# Patient Record
Sex: FEMALE | Race: BLACK OR AFRICAN AMERICAN | NOT HISPANIC OR LATINO | Employment: FULL TIME | URBAN - METROPOLITAN AREA
[De-identification: names, ages, dates, MRNs, and addresses within clinical notes are randomized per-mention and may not be internally consistent; named-entity substitution may affect disease eponyms.]

---

## 2023-03-21 LAB — ALPHA 1 ANTITRYPSIN (MG/DL) IN SER/PLAS: 146 MG/DL (ref 84–218)

## 2023-04-28 ENCOUNTER — OFFICE VISIT (OUTPATIENT)
Dept: PRIMARY CARE | Facility: CLINIC | Age: 43
End: 2023-04-28
Payer: COMMERCIAL

## 2023-04-28 VITALS — WEIGHT: 138 LBS | DIASTOLIC BLOOD PRESSURE: 68 MMHG | BODY MASS INDEX: 27.87 KG/M2 | SYSTOLIC BLOOD PRESSURE: 119 MMHG

## 2023-04-28 DIAGNOSIS — T78.40XS ALLERGY, SEQUELA: Primary | ICD-10-CM

## 2023-04-28 DIAGNOSIS — S63.622A SPRAIN OF INTERPHALANGEAL JOINT OF LEFT THUMB, INITIAL ENCOUNTER: ICD-10-CM

## 2023-04-28 LAB
ANION GAP IN SER/PLAS: 12 MMOL/L (ref 10–20)
BASOPHILS (10*3/UL) IN BLOOD BY AUTOMATED COUNT: 0.05 X10E9/L (ref 0–0.1)
BASOPHILS/100 LEUKOCYTES IN BLOOD BY AUTOMATED COUNT: 0.7 % (ref 0–2)
CALCIUM (MG/DL) IN SER/PLAS: 8.6 MG/DL (ref 8.6–10.3)
CARBON DIOXIDE, TOTAL (MMOL/L) IN SER/PLAS: 28 MMOL/L (ref 21–32)
CHLORIDE (MMOL/L) IN SER/PLAS: 101 MMOL/L (ref 98–107)
CREATININE (MG/DL) IN SER/PLAS: 0.87 MG/DL (ref 0.5–1.05)
EOSINOPHILS (10*3/UL) IN BLOOD BY AUTOMATED COUNT: 0.06 X10E9/L (ref 0–0.7)
EOSINOPHILS/100 LEUKOCYTES IN BLOOD BY AUTOMATED COUNT: 0.9 % (ref 0–6)
ERYTHROCYTE DISTRIBUTION WIDTH (RATIO) BY AUTOMATED COUNT: 15 % (ref 11.5–14.5)
ERYTHROCYTE MEAN CORPUSCULAR HEMOGLOBIN CONCENTRATION (G/DL) BY AUTOMATED: 31.3 G/DL (ref 32–36)
ERYTHROCYTE MEAN CORPUSCULAR VOLUME (FL) BY AUTOMATED COUNT: 81 FL (ref 80–100)
ERYTHROCYTES (10*6/UL) IN BLOOD BY AUTOMATED COUNT: 5.11 X10E12/L (ref 4–5.2)
GFR FEMALE: 84 ML/MIN/1.73M2
GLUCOSE (MG/DL) IN SER/PLAS: 79 MG/DL (ref 74–99)
HEMATOCRIT (%) IN BLOOD BY AUTOMATED COUNT: 41.6 % (ref 36–46)
HEMOGLOBIN (G/DL) IN BLOOD: 13 G/DL (ref 12–16)
IMMATURE GRANULOCYTES/100 LEUKOCYTES IN BLOOD BY AUTOMATED COUNT: 0.1 % (ref 0–0.9)
LEUKOCYTES (10*3/UL) IN BLOOD BY AUTOMATED COUNT: 6.7 X10E9/L (ref 4.4–11.3)
LYMPHOCYTES (10*3/UL) IN BLOOD BY AUTOMATED COUNT: 2.07 X10E9/L (ref 1.2–4.8)
LYMPHOCYTES/100 LEUKOCYTES IN BLOOD BY AUTOMATED COUNT: 30.9 % (ref 13–44)
MONOCYTES (10*3/UL) IN BLOOD BY AUTOMATED COUNT: 0.48 X10E9/L (ref 0.1–1)
MONOCYTES/100 LEUKOCYTES IN BLOOD BY AUTOMATED COUNT: 7.2 % (ref 2–10)
NEUTROPHILS (10*3/UL) IN BLOOD BY AUTOMATED COUNT: 4.03 X10E9/L (ref 1.2–7.7)
NEUTROPHILS/100 LEUKOCYTES IN BLOOD BY AUTOMATED COUNT: 60.2 % (ref 40–80)
PLATELETS (10*3/UL) IN BLOOD AUTOMATED COUNT: 264 X10E9/L (ref 150–450)
POTASSIUM (MMOL/L) IN SER/PLAS: 3.7 MMOL/L (ref 3.5–5.3)
SODIUM (MMOL/L) IN SER/PLAS: 137 MMOL/L (ref 136–145)
UREA NITROGEN (MG/DL) IN SER/PLAS: 12 MG/DL (ref 6–23)

## 2023-04-28 PROCEDURE — 1036F TOBACCO NON-USER: CPT | Performed by: INTERNAL MEDICINE

## 2023-04-28 PROCEDURE — 99213 OFFICE O/P EST LOW 20 MIN: CPT | Performed by: INTERNAL MEDICINE

## 2023-04-28 RX ORDER — EPINEPHRINE 0.15 MG/.3ML
1.5 INJECTION INTRAMUSCULAR ONCE
Qty: 2 EACH | Refills: 1 | Status: SHIPPED | OUTPATIENT
Start: 2023-04-28 | End: 2023-04-28

## 2023-04-28 ASSESSMENT — PAIN SCALES - GENERAL: PAINLEVEL: 4

## 2023-04-28 NOTE — PROGRESS NOTES
Subjective   Patient ID: Nisa Kenny is a 42 y.o. female who presents for Hand Pain.    HPI   42 years old female comes in to see me because of pain on her left thumb for a month now.  She did hurt her thumb on the left while moving furniture a month ago.  It is swollen and it hurts.  She is also having surgery laparoscopic for endometriosis.  She had an MRI of the pelvis which showed leiomyomas.  Surgery is scheduled for May 12 this year.  Lives with her mother in [place].  Allergic to seasonal allergy nuts and shellfish.  Non-smoker and no alcohol intake.  Works as a .  Because of her allergies epipen recommended and ordered  Review of Systems  12 system reviewed and all negative except for bad sinus allergies pain on the left thumb.  She and cardiology before her procedure.  Using ice on her left thumb so far for a month very minimal improvement.  Medications are albuterol inhaler, Advair discus twice a day, montelukast,  Objective   /68 (BP Location: Right arm, Patient Position: Sitting)   Wt 62.6 kg (138 lb)   BMI 27.87 kg/m²     Physical Exam  Alert oriented pleasant cooperative in no distress.  Face symmetrical cranial nerves intact.  Lungs clear no rales no wheezing or crackles.  Neck supple no masses no lymph no thyromegaly or jugular venous distention.  Heart normal S1 and S2 regular rhythm.  Abdomen benign neurologically intact  Assessment/Plan   Diagnoses and all orders for this visit:  Allergy, sequela  -     EPINEPHrine (Epipen-JR) 0.15 mg/0.3 mL injection syringe; Inject 3 mL (1.5 mg) as directed 1 time for 1 dose. Call 911 after use.  Sprain of interphalangeal joint of left thumb, initial encounter

## 2023-04-30 ENCOUNTER — TELEPHONE (OUTPATIENT)
Dept: PRIMARY CARE | Facility: CLINIC | Age: 43
End: 2023-04-30
Payer: COMMERCIAL

## 2023-05-12 ENCOUNTER — TELEPHONE (OUTPATIENT)
Dept: PRIMARY CARE | Facility: CLINIC | Age: 43
End: 2023-05-12
Payer: COMMERCIAL

## 2023-05-12 ENCOUNTER — HOSPITAL ENCOUNTER (OUTPATIENT)
Dept: DATA CONVERSION | Facility: HOSPITAL | Age: 43
Discharge: HOME | End: 2023-05-13
Attending: OBSTETRICS & GYNECOLOGY | Admitting: OBSTETRICS & GYNECOLOGY
Payer: COMMERCIAL

## 2023-05-12 DIAGNOSIS — Z91.040 LATEX ALLERGY STATUS: ICD-10-CM

## 2023-05-12 DIAGNOSIS — D25.9 LEIOMYOMA OF UTERUS, UNSPECIFIED: ICD-10-CM

## 2023-05-12 DIAGNOSIS — N80.9 ENDOMETRIOSIS, UNSPECIFIED: ICD-10-CM

## 2023-05-12 DIAGNOSIS — J45.909 UNSPECIFIED ASTHMA, UNCOMPLICATED (HHS-HCC): ICD-10-CM

## 2023-05-12 DIAGNOSIS — Z86.73 PERSONAL HISTORY OF TRANSIENT ISCHEMIC ATTACK (TIA), AND CEREBRAL INFARCTION WITHOUT RESIDUAL DEFICITS: ICD-10-CM

## 2023-05-12 LAB
ABO GROUP (TYPE) IN BLOOD: NORMAL
ANTIBODY SCREEN: NORMAL
ERYTHROCYTE DISTRIBUTION WIDTH (RATIO) BY AUTOMATED COUNT: 15 % (ref 11.5–14.5)
ERYTHROCYTE MEAN CORPUSCULAR HEMOGLOBIN CONCENTRATION (G/DL) BY AUTOMATED: 30.8 G/DL (ref 32–36)
ERYTHROCYTE MEAN CORPUSCULAR VOLUME (FL) BY AUTOMATED COUNT: 84 FL (ref 80–100)
ERYTHROCYTES (10*6/UL) IN BLOOD BY AUTOMATED COUNT: 4.78 X10E12/L (ref 4–5.2)
HEMATOCRIT (%) IN BLOOD BY AUTOMATED COUNT: 40 % (ref 36–46)
HEMOGLOBIN (G/DL) IN BLOOD: 12.3 G/DL (ref 12–16)
LEUKOCYTES (10*3/UL) IN BLOOD BY AUTOMATED COUNT: 13 X10E9/L (ref 4.4–11.3)
PLATELETS (10*3/UL) IN BLOOD AUTOMATED COUNT: 239 X10E9/L (ref 150–450)
RH FACTOR: NORMAL

## 2023-05-12 NOTE — TELEPHONE ENCOUNTER
Called the patient today and left her a message on her cell phone over her phone indicating the she was typed and matched for blood and may be needing a blood transfusion?.  Left her a message stating to call me back with any update and wishing her well.

## 2023-05-13 LAB
ANION GAP IN SER/PLAS: 15 MMOL/L (ref 10–20)
BASOPHILS (10*3/UL) IN BLOOD BY AUTOMATED COUNT: 0.01 X10E9/L (ref 0–0.1)
BASOPHILS/100 LEUKOCYTES IN BLOOD BY AUTOMATED COUNT: 0.1 % (ref 0–2)
CALCIUM (MG/DL) IN SER/PLAS: 8.4 MG/DL (ref 8.6–10.3)
CARBON DIOXIDE, TOTAL (MMOL/L) IN SER/PLAS: 25 MMOL/L (ref 21–32)
CHLORIDE (MMOL/L) IN SER/PLAS: 104 MMOL/L (ref 98–107)
CREATININE (MG/DL) IN SER/PLAS: 0.79 MG/DL (ref 0.5–1.05)
ERYTHROCYTE DISTRIBUTION WIDTH (RATIO) BY AUTOMATED COUNT: 15.3 % (ref 11.5–14.5)
ERYTHROCYTE MEAN CORPUSCULAR HEMOGLOBIN CONCENTRATION (G/DL) BY AUTOMATED: 31.3 G/DL (ref 32–36)
ERYTHROCYTE MEAN CORPUSCULAR VOLUME (FL) BY AUTOMATED COUNT: 83 FL (ref 80–100)
ERYTHROCYTES (10*6/UL) IN BLOOD BY AUTOMATED COUNT: 4.22 X10E12/L (ref 4–5.2)
GFR FEMALE: >90 ML/MIN/1.73M2
GLUCOSE (MG/DL) IN SER/PLAS: 108 MG/DL (ref 74–99)
HEMATOCRIT (%) IN BLOOD BY AUTOMATED COUNT: 35.1 % (ref 36–46)
HEMOGLOBIN (G/DL) IN BLOOD: 11 G/DL (ref 12–16)
IMMATURE GRANULOCYTES/100 LEUKOCYTES IN BLOOD BY AUTOMATED COUNT: 0.4 % (ref 0–0.9)
LEUKOCYTES (10*3/UL) IN BLOOD BY AUTOMATED COUNT: 10.5 X10E9/L (ref 4.4–11.3)
LYMPHOCYTES (10*3/UL) IN BLOOD BY AUTOMATED COUNT: 0.85 X10E9/L (ref 1.2–4.8)
LYMPHOCYTES/100 LEUKOCYTES IN BLOOD BY AUTOMATED COUNT: 8.1 % (ref 13–44)
MONOCYTES (10*3/UL) IN BLOOD BY AUTOMATED COUNT: 0.88 X10E9/L (ref 0.1–1)
MONOCYTES/100 LEUKOCYTES IN BLOOD BY AUTOMATED COUNT: 8.3 % (ref 2–10)
NEUTROPHILS (10*3/UL) IN BLOOD BY AUTOMATED COUNT: 8.76 X10E9/L (ref 1.2–7.7)
NEUTROPHILS/100 LEUKOCYTES IN BLOOD BY AUTOMATED COUNT: 83.1 % (ref 40–80)
PLATELETS (10*3/UL) IN BLOOD AUTOMATED COUNT: 223 X10E9/L (ref 150–450)
POTASSIUM (MMOL/L) IN SER/PLAS: 4.2 MMOL/L (ref 3.5–5.3)
SODIUM (MMOL/L) IN SER/PLAS: 140 MMOL/L (ref 136–145)
UREA NITROGEN (MG/DL) IN SER/PLAS: 12 MG/DL (ref 6–23)

## 2023-05-18 LAB
COMPLETE PATHOLOGY REPORT: NORMAL
CONVERTED CLINICAL DIAGNOSIS-HISTORY: NORMAL
CONVERTED FINAL DIAGNOSIS: NORMAL
CONVERTED FINAL REPORT PDF LINK TO COPY AND PASTE: NORMAL
CONVERTED GROSS DESCRIPTION: NORMAL

## 2023-07-01 LAB
ALANINE AMINOTRANSFERASE (SGPT) (U/L) IN SER/PLAS: 9 U/L (ref 7–45)
ALBUMIN (G/DL) IN SER/PLAS: 4.1 G/DL (ref 3.4–5)
ALKALINE PHOSPHATASE (U/L) IN SER/PLAS: 50 U/L (ref 33–110)
ANION GAP IN SER/PLAS: 10 MMOL/L (ref 10–20)
ASPARTATE AMINOTRANSFERASE (SGOT) (U/L) IN SER/PLAS: 12 U/L (ref 9–39)
BASOPHILS (10*3/UL) IN BLOOD BY AUTOMATED COUNT: 0.03 X10E9/L (ref 0–0.1)
BASOPHILS/100 LEUKOCYTES IN BLOOD BY AUTOMATED COUNT: 0.7 % (ref 0–2)
BILIRUBIN TOTAL (MG/DL) IN SER/PLAS: 0.4 MG/DL (ref 0–1.2)
CALCIUM (MG/DL) IN SER/PLAS: 9 MG/DL (ref 8.6–10.3)
CARBON DIOXIDE, TOTAL (MMOL/L) IN SER/PLAS: 29 MMOL/L (ref 21–32)
CHLORIDE (MMOL/L) IN SER/PLAS: 102 MMOL/L (ref 98–107)
CREATININE (MG/DL) IN SER/PLAS: 0.83 MG/DL (ref 0.5–1.05)
EOSINOPHILS (10*3/UL) IN BLOOD BY AUTOMATED COUNT: 0.12 X10E9/L (ref 0–0.7)
EOSINOPHILS/100 LEUKOCYTES IN BLOOD BY AUTOMATED COUNT: 2.6 % (ref 0–6)
ERYTHROCYTE DISTRIBUTION WIDTH (RATIO) BY AUTOMATED COUNT: 14.5 % (ref 11.5–14.5)
ERYTHROCYTE MEAN CORPUSCULAR HEMOGLOBIN CONCENTRATION (G/DL) BY AUTOMATED: 31.9 G/DL (ref 32–36)
ERYTHROCYTE MEAN CORPUSCULAR VOLUME (FL) BY AUTOMATED COUNT: 82 FL (ref 80–100)
ERYTHROCYTES (10*6/UL) IN BLOOD BY AUTOMATED COUNT: 4.76 X10E12/L (ref 4–5.2)
ESTIMATED AVERAGE GLUCOSE FOR HBA1C: 111 MG/DL
GFR FEMALE: 89 ML/MIN/1.73M2
GLUCOSE (MG/DL) IN SER/PLAS: 80 MG/DL (ref 74–99)
HEMATOCRIT (%) IN BLOOD BY AUTOMATED COUNT: 39.2 % (ref 36–46)
HEMOGLOBIN (G/DL) IN BLOOD: 12.5 G/DL (ref 12–16)
HEMOGLOBIN A1C/HEMOGLOBIN TOTAL IN BLOOD: 5.5 %
IMMATURE GRANULOCYTES/100 LEUKOCYTES IN BLOOD BY AUTOMATED COUNT: 0.4 % (ref 0–0.9)
LEUKOCYTES (10*3/UL) IN BLOOD BY AUTOMATED COUNT: 4.5 X10E9/L (ref 4.4–11.3)
LYMPHOCYTES (10*3/UL) IN BLOOD BY AUTOMATED COUNT: 1.82 X10E9/L (ref 1.2–4.8)
LYMPHOCYTES/100 LEUKOCYTES IN BLOOD BY AUTOMATED COUNT: 40.2 % (ref 13–44)
MONOCYTES (10*3/UL) IN BLOOD BY AUTOMATED COUNT: 0.36 X10E9/L (ref 0.1–1)
MONOCYTES/100 LEUKOCYTES IN BLOOD BY AUTOMATED COUNT: 7.9 % (ref 2–10)
NEUTROPHILS (10*3/UL) IN BLOOD BY AUTOMATED COUNT: 2.18 X10E9/L (ref 1.2–7.7)
NEUTROPHILS/100 LEUKOCYTES IN BLOOD BY AUTOMATED COUNT: 48.2 % (ref 40–80)
PLATELETS (10*3/UL) IN BLOOD AUTOMATED COUNT: 245 X10E9/L (ref 150–450)
POTASSIUM (MMOL/L) IN SER/PLAS: 3.8 MMOL/L (ref 3.5–5.3)
PROTEIN TOTAL: 7.2 G/DL (ref 6.4–8.2)
SODIUM (MMOL/L) IN SER/PLAS: 137 MMOL/L (ref 136–145)
UREA NITROGEN (MG/DL) IN SER/PLAS: 8 MG/DL (ref 6–23)

## 2023-07-08 ENCOUNTER — TELEPHONE (OUTPATIENT)
Dept: PRIMARY CARE | Facility: CLINIC | Age: 43
End: 2023-07-08
Payer: COMMERCIAL

## 2023-07-08 NOTE — TELEPHONE ENCOUNTER
Called patient and she tells me she is recovering from her surgery she did very well her lab results a month ago looks pretty good her CBC CMP etc.  She denies made a nice recovery so far and I will see her in the future bye-bye

## 2023-07-28 ENCOUNTER — OFFICE VISIT (OUTPATIENT)
Dept: PRIMARY CARE | Facility: CLINIC | Age: 43
End: 2023-07-28
Payer: COMMERCIAL

## 2023-07-28 VITALS
SYSTOLIC BLOOD PRESSURE: 130 MMHG | DIASTOLIC BLOOD PRESSURE: 76 MMHG | WEIGHT: 148 LBS | BODY MASS INDEX: 29.89 KG/M2 | TEMPERATURE: 97.1 F

## 2023-07-28 DIAGNOSIS — H61.21 IMPACTED CERUMEN OF RIGHT EAR: ICD-10-CM

## 2023-07-28 DIAGNOSIS — E78.5 HYPERLIPIDEMIA, UNSPECIFIED HYPERLIPIDEMIA TYPE: Primary | ICD-10-CM

## 2023-07-28 DIAGNOSIS — R53.83 FATIGUE, UNSPECIFIED TYPE: ICD-10-CM

## 2023-07-28 PROCEDURE — 84443 ASSAY THYROID STIM HORMONE: CPT | Performed by: INTERNAL MEDICINE

## 2023-07-28 PROCEDURE — 1036F TOBACCO NON-USER: CPT | Performed by: INTERNAL MEDICINE

## 2023-07-28 PROCEDURE — 69210 REMOVE IMPACTED EAR WAX UNI: CPT | Performed by: INTERNAL MEDICINE

## 2023-07-28 PROCEDURE — 99214 OFFICE O/P EST MOD 30 MIN: CPT | Performed by: INTERNAL MEDICINE

## 2023-07-28 PROCEDURE — 80061 LIPID PANEL: CPT | Performed by: INTERNAL MEDICINE

## 2023-07-28 ASSESSMENT — PAIN SCALES - GENERAL: PAINLEVEL: 0-NO PAIN

## 2023-07-28 NOTE — PROGRESS NOTES
Subjective   Patient ID: Nisa Kenny is a 42 y.o. female who presents for Hypertension, Hyperlipidemia, and Hypothyroidism.    HPI   42 years old female comes in to see me status post total hysterectomy for endometriosis and leiomyoma of the uterus.  She did very well and recovered nicely.  She is feeling well and has no specific pain or symptoms.  She is not happy because she gained 8 pounds since April of this year.It is snacking at work.  She is allergic to shellfish peanuts and latex.  Also complaining of trouble with her right ear.  Review of Systems  12 system reviewed and 12 systems are negative.  Objective   /76 (BP Location: Right arm, Patient Position: Sitting, BP Cuff Size: Adult)   Temp 36.2 °C (97.1 °F) (Temporal)   Wt 67.1 kg (148 lb)   BMI 29.89 kg/m²     Physical Exam  Alert oriented in no distress pleasant cooperative.  Ears wax on the right side ear.  Neck supple no masses no lymph no thyromegaly or jugular venous distention.  Lungs clear no rales wheezing or crackles.  Heart normal S1 and S2 regular rhythm.  Abdomen benign nontender no masses no organomegaly no pain on palpations.  Neurologically intact.  I did flush on her right ear or irrigation.  Assessment/Plan   Diagnoses and all orders for this visit:  Hyperlipidemia, unspecified hyperlipidemia type  -     Lipid Panel  Fatigue, unspecified type  -     Thyroid Stimulating Hormone  Impacted cerumen of right ear  -     Ear cerumen removal; Future

## 2023-08-02 ENCOUNTER — TELEPHONE (OUTPATIENT)
Dept: PRIMARY CARE | Facility: CLINIC | Age: 43
End: 2023-08-02
Payer: COMMERCIAL

## 2023-08-02 NOTE — TELEPHONE ENCOUNTER
I called patient and discussed her lab results.  Thyroid test within normal limit.  Lipids slightly elevated.  Watch your diet and exercise lose weight very important diet low-fat diet low carbohydrate.  We discussed the diet before and you remember that and the answer is yes I do.

## 2023-09-07 VITALS — WEIGHT: 143.74 LBS | BODY MASS INDEX: 28.98 KG/M2 | HEIGHT: 59 IN

## 2023-09-14 NOTE — DISCHARGE SUMMARY
Send Summary:   Discharge Summary Providers:  Provider Role Provider Name   · Attending Marlyn Garnica       Note Recipients: Mayo Carrasco MD Flyckt, Rebecca, MD       Discharge:    Summary:   Admission Date: .12-May-2023 07:59:00   Discharge Date: 13-May-2023   Attending Physician at Discharge: Avelino Call   Admission Reason: laparoscopic myomectomy   Final Discharge Diagnoses: status post laparoscopic  myomectomy   Procedures: Date: 12-May-2023 17:59:00  Procedure Name: 1. laparoscopic myomectomy  2. hysteroscopy   Condition at Discharge: Satisfactory   Disposition at Discharge: .Home   Hospital Course:    43yo G0 with symptomatic fibroid uterus is status post laparoscopic myomectomy. Patient was kept for overnight observation for assurance of proper pain control and  hemodynamic stability given the extent of the surgery. On Postoperative day 1 Patient is clinically stable for discharge to home with outpatient follow up in 2 and 6 weeks.       Discharge Information:    and Continuing Care:   Lab Results - Pending:    Surgical Pathology Drawn at 12-May-2023 15:54:00  Radiology Results - Pending: None   Discharge Instructions:    Activity:           activity as tolerated.          May shower..            May not drive while taking narcotics.            No pushing, pulling, or lifting objects greater than 20 pounds.            Weight-bearing Instructions: full weight bearing.            Pelvic rest for 6 weeks.  This means nothing to enter the vagina:  No sex, douching, tub baths, hot tubs, or swimming.    Nutrition/Diet:           resume normal diet    Wound Care:           Wound Type:   surgical incision,  abdominal          Cleanse With:   soap and water          Cover With:   no dressing, leave open to air          Instructions:   no lotions, creams, or tub soaks    Follow Up Appointments:    Follow-Up Appointment 01:           Physician/Dept/Service:   Dr. Garnica or Dr. Call          Call to  Schedule in:   2 weeks          Phone Number:   [phone]    Discharge Medications: Home Medication   ferrous sulfate 200 mg (65 mg elemental iron) oral tablet - 1 tab(s) orally once a day  ibuprofen 600 mg oral tablet - 1 tab(s) orally 4 times a day   oxyCODONE 5 mg oral tablet - 1 tab(s) orally 4 times a day   acetaminophen 325 mg oral tablet - 2 tab(s) orally every 4 hours  Colace 100 mg oral capsule - 1 cap(s) orally once a day   Advair Diskus 250 mcg-50 mcg inhalation powder - 1 puff(s) inhaled 2 times a day  montelukast 10 mg oral tablet - 1 tab(s) orally once a day (at bedtime)  PreNata - 1 tab(s) orally once a day  Vitamin D3 25 mcg (1000 intl units) oral tablet - 1 tab(s) orally once a day     PRN Medication   tranexamic acid 650 mg oral tablet - 1 tab(s) orally once a day, As Needed  albuterol 90 mcg/inh inhalation aerosol - 2 puff(s) inhaled 3 times a day, As Needed   Issues to Discuss at Follow-up / Goals for Continuing Care:    Patient to be seen in 1-2 weeks postop for incision check by  [facility]    DNR Status:   ·  Code Status Code Status order at time of discharge: Full Code     Attestation:   Note Completion:  I am a:  Resident/Fellow   Attending Attestation I saw and evaluated the patient.  I personally obtained the key and critical portions of the history and physical exam or was physically present for key and  critical portions performed by the resident/fellow. I reviewed the resident/fellow?s documentation and discussed the patient with the resident/fellow.  I agree with the resident/fellow?s medical decision making as documented in the note.     I personally evaluated the patient on 13-May-2023         Electronic Signatures:  Mary Beth Robison (Fellow))  (Signed 13-May-2023 11:21)   Authored: Send Summary, Summary Content, Ongoing Care,  DNR Status   Co-Signer: Send Summary, Summary Content, Ongoing Care, Note Completion  Avelino Call)  (Signed 16-May-2023  12:33)   Authored: Summary Content, Note Completion   Co-Signer: Send Summary, Summary Content, Ongoing Care, DNR Status, Note Completion  Julissa Orona (Fellow))  (Signed 12-May-2023 18:22)   Authored: Send Summary, Summary Content, Ongoing Care,  Note Completion      Last Updated: 16-May-2023 12:33 by Avelino Call)

## 2023-09-14 NOTE — H&P
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 Severe dysmenorrhea and leg pain + painful BM and dyspareunia.   She had surgery in Dec 2021 for endometrial polyp. She had a saline ultrasound and she was told she had fibroids.   AMH= 3.17 2021  No hx acne, extra hair, no breast discharge  HGBa1c = 5.8 2021  TSH 1.4 2021    OBSTETRIC HISTORY- G0     MENSTRUAL HISTORY-   LMP: 2022  Cycle length- q 28 days, Duration- 4-5days, Flow- average 4-5days   Symptoms- pain  Medications during menses: MIdol, IBU, Raspberry leaf tea    GYN HISTORY:  STDs: Hx of HPV 2018  Pap: 2021  Mammo: 2021  Coitus: 1-2times/month     MEDICAL HISTORY: Asthma, Hx of Fibroids, Coats eye disease, lower back pain, knee pain    SURGICAL HISTORY: Significant asthma, HX of Infant open heart surgery, left knee surgery(2016), endometrial polyp, umbilical hernia repair, eye surgery    SOCIAL HISTORY-   Occupation:  Assistant   Toxic habits: None    FAMILY HISTORY No Pert Hx     MEDS: Albuterol inhaler, Flonase, Montelukast, Vit-D3, IBU, Ipratropium-Albuterol    ALLERGIES: Nuts, Shellfish, Pollen, Dust    PARTNER   David East  : 1980 ([facility])  Healthy   Has a 9 year old son  No surgeries  Not taking Testosterone - he is heavy exerciser       Planned Prodecures: Diagnostic hysteroscopy and laparoscopy with poss excision of endometriosis and myomas if they appear to be contributing to symptoms of pain and bloating, chromopertubation      Allergies:        Allergies:  ·  NKDA :   ·  Peanuts : Edema, Itching  ·  Shell  Fish: Facial Swelling, Itching    Home Medication Review:   Home Medications  Reviewed: yes     Impression/Procedure:   ·  Impression and Planned Procedure: Diagnostic hysteroscopy and laparoscopy with poss excision of endometriosis and myomas if they appear to be contributing to symptoms of pain and bloating, chromopertubation       ERAS (Enhanced Recovery After Surgery):  ·  ERAS Patient: yes   ·  CPM/PAT Utilization: no   ·  Immunonutrition Recovery Drink Utilization: no   ·  Carbohydrate Supplement Drink Utilization: no       Physical Exam by System:    Constitutional: Well developed, awake/alert/oriented  x3, no distress, alert and cooperative   Eyes: PERRL, EOMI, clear sclera   Head/Neck: Neck supple, no apparent injury, thyroid  without mass or tenderness, No JVD, trachea midline, no bruits   Respiratory/Thorax: Patent airways, CTAB, normal  breath sounds with good chest expansion, thorax symmetric   Cardiovascular: Regular, rate and rhythm, no murmurs,  2+ equal pulses of the extremities, normal S 1and S 2   Gastrointestinal: Nondistended, soft, non-tender,  no rebound tenderness or guarding, no masses palpable, no organomegaly   Genitourinary: No Discharge, vesicles or other abnormalities   Extremities: normal extremities, no cyanosis edema,  contusions or wounds, no clubbing   Neurological: alert and oriented x3, intact senses   Psychological: Appropriate mood and behavior   Skin: Warm and dry, no lesions, no rashes     Consent:   COVID-19 Consent:  ·  COVID-19 Risk Consent Surgeon has reviewed key risks related to the risk of clarissa COVID-19 and if they contract COVID-19 what the risks are.     Attestation:   Note Completion:  I am a:  Resident/Fellow   Attending Attestation I saw and evaluated the patient.  I personally obtained the key and critical portions of the history and physical exam or was physically present for key and  critical portions performed by the resident/fellow. I reviewed the resident/fellow?s documentation and discussed the patient with the  resident/fellow.  I agree with the resident/fellow?s medical decision making as documented in the note.     I personally evaluated the patient on 11-May-2023         Electronic Signatures:  Marlyn Garnica)  (Signed 19-May-2023 11:27)   Authored: Note Completion   Co-Signer: History of Present Illness, Allergies, Home Medication Review, Impression/Procedure, ERAS, Physical Exam, Consent, Note Completion  Julissa Orona (Fellow))  (Signed 11-May-2023 20:34)   Authored: History of Present Illness, Allergies, Home  Medication Review, Impression/Procedure, ERAS, Physical Exam, Consent, Note Completion      Last Updated: 19-May-2023 11:27 by Marlyn Garnica)

## 2023-09-14 NOTE — PROGRESS NOTES
Service: Gynecology/Obstetrics     Subjective Data:   MONTEZ BARBOZA is a 42 year old Female who is Hospital Day # 2 and POD #1 for 1. laparoscopic myomectomy;2. hysteroscopy.     Pt resting in bed comfortably. Ambulating, voiding without difficulty. Passing flatus. Tolerating PO without n/v. Denies f/c, CP, SOB. No acute complaints.    Objective Data:     Objective Information:      T   P  R  BP   MAP  SpO2   Value  37  93  18  139/74      95%  Date/Time 5/13 8:00 5/13 8:00 5/13 4:56 5/13 8:00    5/13 8:00  Range  (36.6C - 37C )  (93 - 101 )  (18 - 18 )  (114 - 147 )/ (69 - 83 )    (95% - 97% )  Highest temp of 37 C was recorded at 5/13 8:00      Pain reported at 5/13 4:00: sleeping    Physical Exam by System:    Constitutional: Well developed, awake/alert/oriented   Eyes: PERRL, EOMI, clear sclera   ENMT: mucous membranes moist, no apparent injury,  no lesions seen   Head/Neck: NCAT   Respiratory/Thorax: Patent airways, CTAB, normal  breath sounds with good chest expansion   Cardiovascular: Regular, rate and rhythm, no murmurs   Gastrointestinal: Nondistended, soft, non-tender,  no rebound tenderness or guarding, laparoscopic incision sites c/d/i with dermabond in place   Musculoskeletal: ROM intact, no joint swelling, normal  strength   Extremities: no calf tenderness or LE edema   Psychological: Appropriate mood and behavior     Recent Lab Results:    Results:      I have reviewed these laboratory results:    Complete Blood Count + Differential  13-May-2023 06:33:00      Result Value    White Blood Cell Count  10.5    Red Blood Cell Count  4.22    HGB  11.0   L   HCT  35.1   L   MCV  83    MCHC  31.3   L   PLT  223    RDW-CV  15.3   H   Neutrophil %  83.1    Immature Granulocytes %  0.4    Lymphocyte %  8.1    Monocyte %  8.3    Basophil %  0.1    Neutrophil Count  8.76   H   Lymphocyte Count  0.85   L   Monocyte Count  0.88    Basophil Count  0.01      Basic Metabolic Panel  13-May-2023 06:33:00       Result Value    Glucose, Serum  108   H   NA  140    K  4.2    CL  104    Bicarbonate, Serum  25    Anion Gap, Serum  15    BUN  12    CREAT  0.79    GFR Female  >90    Calcium, Serum  8.4   L     Complete Blood Count  12-May-2023 20:02:00      Result Value    White Blood Cell Count  13.0   H   Red Blood Cell Count  4.78    HGB  12.3    HCT  40.0    MCV  84    MCHC  30.8   L   PLT  239    RDW-CV  15.0   H       Assessment and Plan:   Code Status:  ·  Code Status Full Code     Assessment:    42 year old Female who is Hospital Day # 2 and POD #1 for 1. laparoscopic myomectomy;2. hysteroscopy    Neuro   - Pain well controlled with PO pain meds when appropriate     C/V/Heme  - Stable, monitor for now  - POD0 Hb 12.3 -> POD1 Hb 11.0    Pulm  - Sat 95% on RA  - IS 10 q1hr    FEN/GI  - Diet: regular as tolerated  - BMP unremarkable  - antiemetics prn      - Voiding spontaneously  - Strict I/Os    DVT ppx  - SCDs in place    Dispo: anticipate d/c to home today if remains clinically stable    d/w Dr. Oneyda Robison MD PGY-6     Attestation:   Note Completion:  I am a:  Resident/Fellow   Attending Attestation I saw and evaluated the patient.  I personally obtained the key and critical portions of the history and physical exam or was physically present for key and  critical portions performed by the resident/fellow. I reviewed the resident/fellow?s documentation and discussed the patient with the resident/fellow.  I agree with the resident/fellow?s medical decision making as documented in the note.     I personally evaluated the patient on 13-May-2023         Electronic Signatures:  Mary Beth Robison (Fellow))  (Signed 13-May-2023 11:05)   Authored: Service, Subjective Data, Objective Data, Assessment  and Plan, Note Completion  Avelino Call)  (Signed 13-May-2023 13:23)   Authored: Note Completion   Co-Signer: Service, Subjective Data, Objective Data, Assessment and Plan, Note Completion      Last Updated:  13-May-2023 13:23 by Avelino Call)

## 2023-09-29 ENCOUNTER — TELEPHONE (OUTPATIENT)
Dept: PRIMARY CARE | Facility: CLINIC | Age: 43
End: 2023-09-29
Payer: COMMERCIAL

## 2023-09-29 DIAGNOSIS — J02.9 SORE THROAT: ICD-10-CM

## 2023-09-29 DIAGNOSIS — J02.9 PHARYNGITIS, UNSPECIFIED ETIOLOGY: Primary | ICD-10-CM

## 2023-09-29 DIAGNOSIS — H92.09 EAR ACHE: ICD-10-CM

## 2023-09-29 PROBLEM — N84.1 POLYP OF CERVIX UTERI: Status: ACTIVE | Noted: 2021-11-29

## 2023-09-29 PROBLEM — C44.92 SCC (SQUAMOUS CELL CARCINOMA): Status: ACTIVE | Noted: 2023-09-29

## 2023-09-29 PROBLEM — N93.9 ABNORMAL UTERINE BLEEDING (AUB): Status: ACTIVE | Noted: 2023-09-29

## 2023-09-29 PROBLEM — D68.318: Status: ACTIVE | Noted: 2023-09-29

## 2023-09-29 PROBLEM — M71.20 SYNOVIAL CYST OF POPLITEAL SPACE: Status: ACTIVE | Noted: 2023-09-29

## 2023-09-29 PROBLEM — N92.0 MENORRHAGIA: Status: ACTIVE | Noted: 2021-11-29

## 2023-09-29 PROBLEM — H35.052: Status: ACTIVE | Noted: 2023-09-29

## 2023-09-29 PROBLEM — D25.9 FIBROID UTERUS: Status: ACTIVE | Noted: 2023-09-29

## 2023-09-29 PROBLEM — J45.909 ASTHMA (HHS-HCC): Status: ACTIVE | Noted: 2023-09-29

## 2023-09-29 PROBLEM — F41.9 ANXIETY DISORDER: Status: ACTIVE | Noted: 2023-09-29

## 2023-09-29 PROBLEM — N97.9 FEMALE INFERTILITY: Status: ACTIVE | Noted: 2023-09-29

## 2023-09-29 PROBLEM — S83.006A CLOSED DISLOCATION OF PATELLA: Status: ACTIVE | Noted: 2023-09-29

## 2023-09-29 PROBLEM — Z86.018 HISTORY OF UTERINE FIBROID: Status: ACTIVE | Noted: 2023-09-29

## 2023-09-29 PROBLEM — N60.19 FIBROCYSTIC BREAST: Status: ACTIVE | Noted: 2023-09-29

## 2023-09-29 PROBLEM — H35.372 EPIRETINAL MEMBRANE (ERM) OF LEFT EYE: Status: ACTIVE | Noted: 2023-09-29

## 2023-09-29 PROBLEM — L29.9 PRURITUS: Status: ACTIVE | Noted: 2023-09-29

## 2023-09-29 PROBLEM — E55.9 VITAMIN D DEFICIENCY: Status: ACTIVE | Noted: 2023-09-29

## 2023-09-29 PROBLEM — L30.9 DERMATITIS, ECZEMATOID: Status: ACTIVE | Noted: 2023-09-29

## 2023-09-29 PROBLEM — H35.022: Status: ACTIVE | Noted: 2023-09-29

## 2023-09-29 PROBLEM — H35.62: Status: ACTIVE | Noted: 2023-09-29

## 2023-09-29 PROBLEM — Q26.2: Status: ACTIVE | Noted: 2023-09-29

## 2023-09-29 PROBLEM — R06.02 SHORTNESS OF BREATH: Status: ACTIVE | Noted: 2023-09-29

## 2023-09-29 PROBLEM — C44.90 SKIN CANCER: Status: ACTIVE | Noted: 2023-09-29

## 2023-09-29 PROBLEM — H52.10 MYOPIA: Status: ACTIVE | Noted: 2023-09-29

## 2023-09-29 PROBLEM — N64.4 BREAST TENDERNESS IN FEMALE: Status: ACTIVE | Noted: 2023-09-29

## 2023-09-29 RX ORDER — MONTELUKAST SODIUM 10 MG/1
1 TABLET ORAL NIGHTLY
COMMUNITY
Start: 2015-05-19

## 2023-09-29 RX ORDER — FLUTICASONE PROPIONATE AND SALMETEROL 50; 250 UG/1; UG/1
POWDER RESPIRATORY (INHALATION)
COMMUNITY

## 2023-09-29 RX ORDER — CLOBETASOL PROPIONATE 0.5 MG/G
CREAM TOPICAL
COMMUNITY
Start: 2016-06-08 | End: 2024-01-30 | Stop reason: ALTCHOICE

## 2023-09-29 RX ORDER — NORETHINDRONE ACETATE AND ETHINYL ESTRADIOL .03; 1.5 MG/1; MG/1
TABLET ORAL
COMMUNITY
Start: 2009-05-27 | End: 2024-01-30 | Stop reason: ALTCHOICE

## 2023-09-29 RX ORDER — IBUPROFEN 600 MG/1
600 TABLET ORAL 4 TIMES DAILY
COMMUNITY

## 2023-09-29 RX ORDER — ALBUTEROL SULFATE 0.83 MG/ML
SOLUTION RESPIRATORY (INHALATION)
COMMUNITY
Start: 2015-05-19

## 2023-09-29 RX ORDER — FLUTICASONE PROPIONATE 50 MCG
SPRAY, SUSPENSION (ML) NASAL
COMMUNITY
Start: 2022-03-15

## 2023-09-29 RX ORDER — AZITHROMYCIN 250 MG/1
TABLET, FILM COATED ORAL
Qty: 6 TABLET | Refills: 0 | Status: SHIPPED | OUTPATIENT
Start: 2023-09-29 | End: 2023-10-04

## 2023-09-29 RX ORDER — EPINEPHRINE 0.3 MG/.3ML
INJECTION SUBCUTANEOUS
COMMUNITY
Start: 2023-04-28

## 2023-09-29 RX ORDER — CHOLECALCIFEROL (VITAMIN D3) 50 MCG
1 TABLET ORAL DAILY
COMMUNITY
Start: 2015-04-08

## 2023-09-29 RX ORDER — DOCUSATE SODIUM 100 MG/1
100 CAPSULE, LIQUID FILLED ORAL DAILY
COMMUNITY
Start: 2023-05-13 | End: 2024-01-30 | Stop reason: ALTCHOICE

## 2023-09-29 RX ORDER — IPRATROPIUM BROMIDE AND ALBUTEROL SULFATE 2.5; .5 MG/3ML; MG/3ML
SOLUTION RESPIRATORY (INHALATION)
COMMUNITY
Start: 2022-03-15

## 2023-09-29 RX ORDER — TRANEXAMIC ACID 650 MG/1
TABLET ORAL
COMMUNITY
Start: 2022-10-06

## 2023-10-02 NOTE — OP NOTE
Post Operative Note:     PreOp Diagnosis: uterine fibroids   Post-Procedure Diagnosis: uterine fibroids   Procedure: 1. laparoscopic myomectomy  2. hysteroscopy   Surgeon: Dr. Call   Resident/Fellow/Other Assistant: Adwoa PGY5   I.V. Fluids: 1000   Estimated Blood Loss (mL): 25   Specimen: yes. uterine fibroids   Complications: None   Findings: multiple uterine fibroids   Patient Returned To/Condition: PACU/Stable   Urine Output: 300     Operative Report Dictated:  Dictation: not applicable - note contains Operative  Report   Operative Report:    LAPAROSCOPIC ASSISTED MYOMECTOMY OPERATIVE NOTE       Findings: Multiple uterine fibroids ranging from FIGO 5 to FIGO 7      Specimens: Five uterine fibroids    Procedure: The patient was taken to the operating room where general anesthesia was found to be adequate. She was placed in the dorsolithotomy position then prepped and draped in a sterile fashion. A Marcus catheter was inserted into the bladder.      A sterile speculum was placed into the vagina and the cervix was grasped with a single toothed tenaculum.     PROCEDURE: The patient was taken to the operating room and placed in the dorsal  supine position. She was placed under general anesthesia and was positioned in  the dorsal lithotomy position. She was prepped and draped in the usual sterile  fashion and placed in steep Trendelenburg.     A speculum was placed into the vagina and the cervix was visualized. The anterior lip of the cervix was grasped with a single-tooth tenaculum. The Aveta hysteroscope was placed through the cervical os into the uterus. The ostia were visualized bilaterally.  Normal endometrial canal was appreciated.    The hysteroscope was removed and the uterine manipulator was placed and our attention was turned to the abdomen.      The umbilicus was injected with 0.25% marcaine. Veress needle with saline drop was placed through the umbilicus and pneumoperitoneum was established.  Opening pressure was negative 1MM Hg.  A small intraumbilical skin incision then was made with the scalpel  and the 5 mm trocar was used to enter the abdomen with optical guidance. Following intra-abdominal confirmation, the abdomen was insufflated with CO2 gas. Two 5 mm trocars were placed in the right and left lower quadrant. All of these ports were placed  under direct visualization with no complications.     The findings noted above were observed. Dilute (20 units in 100 ml saline) vasopressin was injected into the myoma bed beds. After blanching was achieved, The FIGO type 6 fibroids were resected using the Tenaculum and Sonicision scalpel to transect the  stalk of the fibroid. FOr the larger FIGO type 7 fibroid two endoloops were used to place a stich on the fibroid stalk and following this the Sonicision scalpel was used to resect the fibroid above the knots.For FIGO type 5 fibroid, the Sonicision scalpel  was used to create an incision over the myoma. This was carried down into the capsule, and when the myoma was identified, a 5 mm tenaculum was placed through the lateral port for traction. Using the Sonicision scalpel and the graspers, the fibroid was  enucleated with the assistance of the 5 mm tenaculum. We were very careful to not place excessive traction on the myoma and instead pushed the myometrial and endometrial fibroids down and away. Total of 5 fibroids were removed in the fashions described  above.    We then made our minilaparotomy incision. This was made approximately 2 cm superior to symphysis pubis, in a low transverse fashion of approximately 3 cm in length. This was carried down to the underlying layer of the fascia, which was incised in the  midline and extended bilaterally using the cautery. Kocher clamps were then applied superiorly and inferiorly and the fascia was dissected off the rectus muscles beneath. The rectus muscles were divided and the peritoneal cavity was identified and  entered.  This incision was extended and we placed the GelPoint retractor. All fibroids were placed into the 10mm endocatch bag and removed from the minilaparotomy.     The bed of the myoma was then closed in several layers of 0 VLock. We then closed the serosa using 3-0 VLock in a running fashion. There was very minimal bleeding. Once the serosa was closed, the pelvis was irrigated and cleared of any clot and debris.  Tisseel was applied to the incisions for optimal hemostasis. Chromopertubation was performed with bilateral tubal patency demonstrated.  Again, we irrigated and were satisfied with our hemostasis. The minilaparotomy incision was closed with 0-Vicryl and  after this, all of the skin incisions were closed using 4-0 Vicryl in a subcuticular fashion.     In my opinion, due to location and the depth of the incsion, the patient will require  section for delivery.     The patient tolerated the procedure well. She returned to recovery room in stable condition. All sponge, lap, and needle counts were correct x 2. The patient received two grams of ancef prior to the procedure.     Attending Dr. Call performed the entire procedure with the assistance of the fellow.       Attestation:   Note Completion:  I am a: Resident/Fellow   Attending Attestation I was present for the entire procedure          Electronic Signatures:  Avelino Call)  (Signed 31-May-2023 15:12)   Authored: Note Completion   Co-Signer: Post Operative Note, Note Completion  Julissa Orona (Fellow))  (Signed 12-May-2023 18:16)   Authored: Post Operative Note, Note Completion      Last Updated: 31-May-2023 15:12 by Avelino Call (MD)

## 2023-10-03 ENCOUNTER — TELEPHONE (OUTPATIENT)
Dept: PRIMARY CARE | Facility: CLINIC | Age: 43
End: 2023-10-03

## 2023-10-03 ENCOUNTER — OFFICE VISIT (OUTPATIENT)
Dept: HEMATOLOGY/ONCOLOGY | Facility: CLINIC | Age: 43
End: 2023-10-03
Payer: COMMERCIAL

## 2023-10-03 VITALS
TEMPERATURE: 98.1 F | DIASTOLIC BLOOD PRESSURE: 78 MMHG | RESPIRATION RATE: 18 BRPM | OXYGEN SATURATION: 97 % | HEIGHT: 59 IN | BODY MASS INDEX: 29.82 KG/M2 | WEIGHT: 147.93 LBS | HEART RATE: 78 BPM | SYSTOLIC BLOOD PRESSURE: 129 MMHG

## 2023-10-03 DIAGNOSIS — D50.9 IRON DEFICIENCY ANEMIA, UNSPECIFIED IRON DEFICIENCY ANEMIA TYPE: Primary | ICD-10-CM

## 2023-10-03 DIAGNOSIS — D64.9 ANEMIA, UNSPECIFIED: ICD-10-CM

## 2023-10-03 LAB
ALBUMIN SERPL BCP-MCNC: 4.2 G/DL (ref 3.4–5)
ALP SERPL-CCNC: 59 U/L (ref 33–110)
ALT SERPL W P-5'-P-CCNC: 14 U/L (ref 7–45)
ANION GAP SERPL CALC-SCNC: 12 MMOL/L (ref 10–20)
AST SERPL W P-5'-P-CCNC: 16 U/L (ref 9–39)
BASOPHILS # BLD AUTO: 0.03 X10*3/UL (ref 0–0.1)
BASOPHILS NFR BLD AUTO: 0.5 %
BILIRUB SERPL-MCNC: 0.3 MG/DL (ref 0–1.2)
BUN SERPL-MCNC: 14 MG/DL (ref 6–23)
CALCIUM SERPL-MCNC: 9.2 MG/DL (ref 8.6–10.3)
CHLORIDE SERPL-SCNC: 100 MMOL/L (ref 98–107)
CO2 SERPL-SCNC: 26 MMOL/L (ref 21–32)
CREAT SERPL-MCNC: 0.78 MG/DL (ref 0.5–1.05)
EOSINOPHIL # BLD AUTO: 0.09 X10*3/UL (ref 0–0.7)
EOSINOPHIL NFR BLD AUTO: 1.6 %
ERYTHROCYTE [DISTWIDTH] IN BLOOD BY AUTOMATED COUNT: 14.3 % (ref 11.5–14.5)
GFR SERPL CREATININE-BSD FRML MDRD: >90 ML/MIN/1.73M*2
GLUCOSE SERPL-MCNC: 81 MG/DL (ref 74–99)
HCT VFR BLD AUTO: 38.4 % (ref 36–46)
HGB BLD-MCNC: 11.9 G/DL (ref 12–16)
IMM GRANULOCYTES # BLD AUTO: 0.01 X10*3/UL (ref 0–0.7)
IMM GRANULOCYTES NFR BLD AUTO: 0.2 % (ref 0–0.9)
LYMPHOCYTES # BLD AUTO: 1.95 X10*3/UL (ref 1.2–4.8)
LYMPHOCYTES NFR BLD AUTO: 35.1 %
MCH RBC QN AUTO: 24.8 PG (ref 26–34)
MCHC RBC AUTO-ENTMCNC: 31 G/DL (ref 32–36)
MCV RBC AUTO: 80 FL (ref 80–100)
MONOCYTES # BLD AUTO: 0.38 X10*3/UL (ref 0.1–1)
MONOCYTES NFR BLD AUTO: 6.8 %
NEUTROPHILS # BLD AUTO: 3.09 X10*3/UL (ref 1.2–7.7)
NEUTROPHILS NFR BLD AUTO: 55.8 %
NRBC BLD-RTO: 0 /100 WBCS (ref 0–0)
PLATELET # BLD AUTO: 269 X10*3/UL (ref 150–450)
PMV BLD AUTO: 10.3 FL (ref 7.5–11.5)
POTASSIUM SERPL-SCNC: 3.8 MMOL/L (ref 3.5–5.3)
PROT SERPL-MCNC: 7.3 G/DL (ref 6.4–8.2)
RBC # BLD AUTO: 4.8 X10*6/UL (ref 4–5.2)
SODIUM SERPL-SCNC: 134 MMOL/L (ref 136–145)
WBC # BLD AUTO: 5.6 X10*3/UL (ref 4.4–11.3)

## 2023-10-03 PROCEDURE — 1036F TOBACCO NON-USER: CPT | Performed by: INTERNAL MEDICINE

## 2023-10-03 PROCEDURE — 36415 COLL VENOUS BLD VENIPUNCTURE: CPT | Performed by: INTERNAL MEDICINE

## 2023-10-03 PROCEDURE — 85025 COMPLETE CBC W/AUTO DIFF WBC: CPT | Performed by: INTERNAL MEDICINE

## 2023-10-03 PROCEDURE — 99213 OFFICE O/P EST LOW 20 MIN: CPT | Performed by: INTERNAL MEDICINE

## 2023-10-03 PROCEDURE — 80053 COMPREHEN METABOLIC PANEL: CPT | Performed by: INTERNAL MEDICINE

## 2023-10-03 ASSESSMENT — PAIN SCALES - GENERAL: PAINLEVEL: 0-NO PAIN

## 2023-10-03 NOTE — PROGRESS NOTES
History of Present Illness:      ID Statement:    MONTEZ BARBOZA is a 42 year old Female        Interval History:    History of present illness:      The patient is a 42-year-old -American woman who is being evaluated for menometrorrhagia as well as evaluation of fertility.  During 1 such evaluation Antithrombin III antigen level was 125% reference range was .  A CBC on November 12, 2022  revealed WBC 5.2 hemoglobin 12.2 platelets 2 69,000.  Protein C anticardiolipin antibody protein as factor V prothrombin gene mutation studies were normal.  The patient was referred for further evaluation and management.  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  Recent color Doppler echocardiogram revealed normal ejection fraction of 55 to 60%.  There was low normal right ventricular systolic function.  The patient did not have any antecedent  history of deep vein thrombosis pulmonary embolism nor does she have any family history of bleeding diathesis or deep vein thrombosis.      At interview on February 7, 2023 the patient narrated entire history.  She is very anxious but denied any history of fevers night sweats weight loss nausea vomiting hematemesis melena hematochezia hematuria.      The patient had come for follow-up on March 7, 2023.  She is anxious to know the results of the tests performed.     The patient had come for follow-up on October 3, 2023 regarding history of iron deficiency anemia secondary to heavy menstrual period.  The patient underwent myomectomy on May 12, 2023 and is recovering from surgery.     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      Left knee corrective surgery in 2015      Childhood umbilical hernia repair.      Myomectomy on May 12, 2023     Mammogram in September 2022.      Colonoscopy:      Never had 1      Family  history:      Mother had multiple myeloma      Maternal grandmother had leukemia      Mother's sister had breast cancer      Maternal great aunt also had breast cancer.      Personal history and social history:      42 years old she is single has no children Works as a .  No history of smoking 1 glass of wine a week no history of drug abuse        Review of Systems:   ·  System Review All other systems have been reviewed and are negative for complaint.            Allergies and Intolerances:       Allergies:         NKDA: Active         Latex: Latex, Rash, Active         Peanuts: Food, Edema, Itching, Active         Shell Fish: Food, Facial Swelling, Itching, Active     Outpatient Medication Profile:  * Patient Currently Takes Medications as of 06-Jun-2023 15:23 documented in Structured Notes         Colace 100 mg oral capsule : Last Dose Taken:  , 1 cap(s) orally once a day , Start Date: 13-May-2023         acetaminophen 325 mg oral tablet: Last Dose Taken:  , 2 tab(s) orally  every 4 hours, Start Date: 13-May-2023         oxyCODONE 5 mg oral tablet: Last Dose Taken:  , 1 tab(s) orally 4 times  a day , Start Date: 12-May-2023         ibuprofen 600 mg oral tablet: Last Dose Taken:  , 1 tab(s) orally 4 times  a day , Start Date: 12-May-2023         Advair Diskus 250 mcg-50 mcg inhalation powder: Last Dose Taken:  , 1  puff(s) inhaled 2 times a day         albuterol 90 mcg/inh inhalation aerosol: Last Dose Taken:  , 2 puff(s)  inhaled 3 times a day, As Needed         montelukast 10 mg oral tablet: Last Dose Taken:  , 1 tab(s) orally once  a day (at bedtime)         PreNata: Last Dose Taken:  , 1 tab(s) orally once a day         tranexamic acid 650 mg oral tablet: Last Dose Taken:  , 1 tab(s) orally  once a day, As Needed         Vitamin D3 25 mcg (1000 intl units) oral tablet: Last Dose Taken:  ,  1 tab(s) orally once a day         ferrous sulfate 200 mg (65 mg elemental iron) oral tablet: Last Dose  Taken:  ,  1 tab(s) orally once a day             Medical History:         H/O menorrhagia: ICD-10: Z87.42, Status: Active     Family History: No Family History items are recorded  in the problem list.      Social History:   Social Substance History:  ·  Social History denies smoking, alcohol and drug use   ·  Smoking Status never smoker (1)   ·  Tobacco Use denies   ·  Alcohol Use occasionally   ·  Drug Use denies            Vitals and Measurements:   Vitals: Temp: 36.6  HR: 83  RR: 18  BP: 159/83  SPO2%:   100   Measurements: HT(cm): 149  WT(kg): 64.9  BSA: 1.63   BMI:  29.2      Physical Exam:      Constitutional: Well developed, awake/alert/oriented  x3, no distress, alert and cooperative   Eyes: PERRL, EOMI, clear sclera   ENMT: mucous membranes moist, no apparent injury,  no lesions seen   Head/Neck: Neck supple, no apparent injury, thyroid  without mass or tenderness, No JVD, trachea midline, no bruits   Respiratory/Thorax: Patent airways, CTAB, normal  breath sounds with good chest expansion, thorax symmetric   Cardiovascular: Regular, rate and rhythm, no murmurs,  2+ equal pulses of the extremities, normal S 1and S 2   Gastrointestinal: Nondistended, soft, non-tender,  no rebound tenderness or guarding, no masses palpable, no organomegaly, +BS, no bruits   Genitourinary: No Discharge, vesicles or other abnormalities   Musculoskeletal: ROM intact, no joint swelling, normal  strength   Extremities: normal extremities, no cyanosis edema,  contusions or wounds, no clubbing   Neurological: alert and oriented x3, intact senses,  motor, response and reflexes, normal strength   Breast: No masses, tenderness, no discharge or discoloration   Lymphatic: No significant lymphadenopathy   Psychological: Appropriate mood and behavior   Skin: Warm and dry, no lesions, no rashes         Lab Results:     ·  Results        CBC date/time       WBC     HGB     HCT     PLT     Neut      06-Jun-2023 15:00   4.6     12.7    42.3    232      2.40     BMP date/time       NA              K               CL              CO2             BUN             CREAT             13-May-2023 06:33   140             4.2             104             N/A             12              0.79     Assessment and Plan:      Assessment and Plan:   Assessment:    diathesis or deep vein thrombosis.  n-American woman who is being evaluated for menometrorrhagia as well as evaluation of fertility.  During 1 such evaluation  Antithrombin III antigen level was 125% reference range was .  A CBC on November 12, 2022 revealed WBC 5.2 hemoglobin 12.2 platelets 2 69,000.  Protein C anticardiolipin antibody protein as factor V prothrombin gene mutation studies were normal.   The patient was referred for further evaluation and management.  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  Recent color Doppler echocardiogram revealed normal ejection fraction of 55 to 60%.   There was low normal right ventricular systolic function.  The patient did not have any antecedent history of deep vein thrombosis pulmonary embolism nor does she have any family history of bleeding diathesis or deep vein thrombosis.      I had a detailed discussion with the patient explained to her about coagulation and various proteins associated.  The patient never had a history of vein thrombosis or pulmonary embolism nor does she have family history of deep vein thrombosis or pulmonary  embolism.  Hypercoagulable state work-up done as a part of evaluation for fertility.  The patient did have mildly elevated Antithrombin III antigen levels.  I think it would be prudent to repeat these levels as well as obtain baseline studies such as  CBC PT PTT INR.  There were no therapeutic recommendations today.  The patient understood appreciated all the details provided and was grateful.      The patient had come for follow-up on March 7, 2023.  The patient is asymptomatic  but anxious to know the results of the tests performed.  I had a detailed discussion with the patient explained to her that CBC, APTT, PT, INR, fibrinogen levels were in  reference range.  Mutation studies for factor V Leiden and prothrombin were negative.  The patient has mildly elevated Antithrombin III and can be monitored.  The patient is scheduled to undergo surgery and is cleared for surgery with due to 6.  She will  need to be monitored closely after surgery until ambulatory.  The patient understood appreciated all the details provided and was grateful.     The patient had come for follow-up on October 3, 2023 regarding history of iron deficiency anemia secondary to heavy menstrual period's.  The patient was placed on oral iron replacement therapy.  Her hemoglobin had improved up to 12.3 g/dL.  She underwent  a myomectomy on May 12, 2023 and is recovering from surgery.  The patient is feeling much better.  She has more energy.  CBC ordered results are pending.  Patient to call back tomorrow.  If hemoglobin is normal patient to discontinue oral iron and return as needed.

## 2023-10-27 ENCOUNTER — ANCILLARY PROCEDURE (OUTPATIENT)
Dept: RADIOLOGY | Facility: CLINIC | Age: 43
End: 2023-10-27
Payer: COMMERCIAL

## 2023-10-27 ENCOUNTER — OFFICE VISIT (OUTPATIENT)
Dept: PRIMARY CARE | Facility: CLINIC | Age: 43
End: 2023-10-27
Payer: COMMERCIAL

## 2023-10-27 VITALS
WEIGHT: 148 LBS | TEMPERATURE: 97.3 F | HEART RATE: 78 BPM | BODY MASS INDEX: 31.07 KG/M2 | DIASTOLIC BLOOD PRESSURE: 91 MMHG | HEIGHT: 58 IN | SYSTOLIC BLOOD PRESSURE: 145 MMHG

## 2023-10-27 DIAGNOSIS — R13.14 PHARYNGOESOPHAGEAL DYSPHAGIA: ICD-10-CM

## 2023-10-27 DIAGNOSIS — J45.41 MODERATE PERSISTENT ASTHMA WITH EXACERBATION (HHS-HCC): ICD-10-CM

## 2023-10-27 DIAGNOSIS — Z12.31 SCREENING MAMMOGRAM FOR BREAST CANCER: ICD-10-CM

## 2023-10-27 DIAGNOSIS — Z23 FLU VACCINE NEED: Primary | ICD-10-CM

## 2023-10-27 DIAGNOSIS — Z12.11 SCREENING FOR MALIGNANT NEOPLASM OF COLON: ICD-10-CM

## 2023-10-27 PROCEDURE — 90471 IMMUNIZATION ADMIN: CPT | Performed by: INTERNAL MEDICINE

## 2023-10-27 PROCEDURE — 99214 OFFICE O/P EST MOD 30 MIN: CPT | Performed by: INTERNAL MEDICINE

## 2023-10-27 PROCEDURE — 1036F TOBACCO NON-USER: CPT | Performed by: INTERNAL MEDICINE

## 2023-10-27 PROCEDURE — 90686 IIV4 VACC NO PRSV 0.5 ML IM: CPT | Performed by: INTERNAL MEDICINE

## 2023-10-27 PROCEDURE — 71046 X-RAY EXAM CHEST 2 VIEWS: CPT | Performed by: RADIOLOGY

## 2023-10-27 PROCEDURE — 71046 X-RAY EXAM CHEST 2 VIEWS: CPT | Mod: FY

## 2023-10-27 ASSESSMENT — PAIN SCALES - GENERAL: PAINLEVEL: 0-NO PAIN

## 2023-10-27 NOTE — PROGRESS NOTES
"Subjective   Patient ID: Nisa Kenny is a 43 y.o. female who presents for No chief complaint on file..    HPI   43 years old female comes in to see me today with a chief complaint of dysphagia to liquid specifically cold icy liquid.  It happened on at least 3 occasions and she felt like she was having difficulty breathing after she swallowed and it felt like her water was going in a different pipe as she stated.  She had difficulty breathing and after a few minutes it went away.  It affected her breathing , it started  a month ago.    Review of Systems  12 system review 12 system negative.  No chest pain or shortness of breath.  She is due for mammograms and for colonoscopy.  She has a history of asthma and anxiety.  Objective   BP (!) 145/91 (BP Location: Left arm, Patient Position: Sitting, BP Cuff Size: Adult)   Pulse 78   Temp 36.3 °C (97.3 °F) (Temporal)   Ht 1.473 m (4' 10\")   Wt 67.1 kg (148 lb)   BMI 30.93 kg/m²     Physical Exam  Alert oriented pleasant cooperative in no acute distress.  Neck supple no masses no lymph node thyromegaly or jugular venous distention.  Face symmetrical cranial nerves intact.  Lungs clear no rales wheezing or crackles.  Heart normal S1 and S2 regular rhythm.  Abdomen benign nontender no masses no organomegaly.  Neurological exam intact.  Agreed to obtain a chest x-ray for her dysphagia.  Advised her to drink on the room temperature water and keep a log on her dysphagia.  Handicap placard provided for 5 years since she complained of cold induced asthma when she is walking in the parking lot.  Assessment/Plan   Diagnoses and all orders for this visit:  Flu vaccine need  -     Flu vaccine (IIV4) age 6 months and greater, preservative free  Screening mammogram for breast cancer  -     BI mammo bilateral screening tomosynthesis; Future  Screening for malignant neoplasm of colon  -     Colonoscopy Screening; Average Risk Patient; Future  Pharyngoesophageal dysphagia  -     XR " chest 2 views; Future  Moderate persistent asthma with exacerbation  -     Disability Placard

## 2023-10-30 ENCOUNTER — TELEPHONE (OUTPATIENT)
Dept: PRIMARY CARE | Facility: CLINIC | Age: 43
End: 2023-10-30
Payer: COMMERCIAL

## 2023-10-30 NOTE — TELEPHONE ENCOUNTER
PC:results told-Chest x ray negative, if you continue to sholk when drinking cold water , let me know.

## 2023-11-02 ENCOUNTER — OFFICE VISIT (OUTPATIENT)
Dept: OPHTHALMOLOGY | Facility: CLINIC | Age: 43
End: 2023-11-02
Payer: COMMERCIAL

## 2023-11-02 DIAGNOSIS — H52.4 MYOPIA WITH PRESBYOPIA OF BOTH EYES: ICD-10-CM

## 2023-11-02 DIAGNOSIS — H52.13 MYOPIA WITH PRESBYOPIA OF BOTH EYES: ICD-10-CM

## 2023-11-02 DIAGNOSIS — H02.889 MEIBOMIAN GLAND DISEASE, UNSPECIFIED LATERALITY: ICD-10-CM

## 2023-11-02 DIAGNOSIS — H35.022 COATS' DISEASE OF LEFT EYE: Primary | ICD-10-CM

## 2023-11-02 DIAGNOSIS — H35.372 EPIRETINAL MEMBRANE (ERM) OF LEFT EYE: ICD-10-CM

## 2023-11-02 PROCEDURE — FLVLF CONTACT LENS EVALUATION (SP): Performed by: STUDENT IN AN ORGANIZED HEALTH CARE EDUCATION/TRAINING PROGRAM

## 2023-11-02 PROCEDURE — 92250 FUNDUS PHOTOGRAPHY W/I&R: CPT | Performed by: STUDENT IN AN ORGANIZED HEALTH CARE EDUCATION/TRAINING PROGRAM

## 2023-11-02 PROCEDURE — 92015 DETERMINE REFRACTIVE STATE: CPT | Performed by: STUDENT IN AN ORGANIZED HEALTH CARE EDUCATION/TRAINING PROGRAM

## 2023-11-02 PROCEDURE — 92014 COMPRE OPH EXAM EST PT 1/>: CPT | Performed by: STUDENT IN AN ORGANIZED HEALTH CARE EDUCATION/TRAINING PROGRAM

## 2023-11-02 ASSESSMENT — REFRACTION_CURRENTRX
OD_BRAND: BIOTRUE 1 DAY
OD_CYLINDER: SPHERE
OD_BASECURVE: 8.6
OS_CYLINDER: SPHERE
OS_BRAND: BIOTRUE 1 DAY
OS_SPHERE: -2.50
OS_DIAMETER: 14.2
OS_BASECURVE: 8.6
OD_DIAMETER: 14.2
OD_SPHERE: -1.50

## 2023-11-02 ASSESSMENT — REFRACTION_MANIFEST
OS_CYLINDER: -0.50
OD_CYLINDER: -0.50
OD_SPHERE: -1.00
OD_SPHERE: -1.50
OS_AXIS: 080
OD_AXIS: 105
OS_AXIS: 080
OD_AXIS: 105
METHOD_AUTOREFRACTION: 1
OS_SPHERE: -2.25
OD_CYLINDER: -0.50
OD_ADD: +1.25
OS_CYLINDER: -0.50
OS_ADD: +1.25
OS_SPHERE: -2.00

## 2023-11-02 ASSESSMENT — VISUAL ACUITY
VA_OR_OS_CURRENT_RX: 20/20-1
OD_PH_SC: 20/20
VA_OR_OD_CURRENT_RX: 20/20-1
OD_SC: 20/40
OS_PH_SC: 20/20
METHOD: SNELLEN - LINEAR
OS_SC: 20/40
OS_PH_SC+: -3

## 2023-11-02 ASSESSMENT — ENCOUNTER SYMPTOMS
CONSTITUTIONAL NEGATIVE: 0
CARDIOVASCULAR NEGATIVE: 0
EYES NEGATIVE: 1
RESPIRATORY NEGATIVE: 0
HEMATOLOGIC/LYMPHATIC NEGATIVE: 0
ENDOCRINE NEGATIVE: 0
PSYCHIATRIC NEGATIVE: 0
NEUROLOGICAL NEGATIVE: 0
GASTROINTESTINAL NEGATIVE: 0
MUSCULOSKELETAL NEGATIVE: 0
ALLERGIC/IMMUNOLOGIC NEGATIVE: 0

## 2023-11-02 ASSESSMENT — TONOMETRY
IOP_METHOD: GOLDMANN APPLANATION
OD_IOP_MMHG: 14
OS_IOP_MMHG: 14

## 2023-11-02 ASSESSMENT — REFRACTION_WEARINGRX
OS_SPHERE: -2.50
OD_SPHERE: -1.50
OD_CYLINDER: -0.50
OS_CYLINDER: SPHERE
OD_AXIS: 142

## 2023-11-02 ASSESSMENT — CONF VISUAL FIELD: METHOD: COUNTING FINGERS

## 2023-11-02 ASSESSMENT — CUP TO DISC RATIO
OS_RATIO: .30
OD_RATIO: .30

## 2023-11-02 ASSESSMENT — SLIT LAMP EXAM - LIDS
COMMENTS: NORMAL, MILD MGD UL/LL
COMMENTS: NORMAL, MILD MGD UL/LL

## 2023-11-02 ASSESSMENT — EXTERNAL EXAM - LEFT EYE: OS_EXAM: NORMAL

## 2023-11-02 ASSESSMENT — EXTERNAL EXAM - RIGHT EYE: OD_EXAM: NORMAL

## 2023-11-03 NOTE — PROGRESS NOTES
Assessment/Plan   Diagnoses and all orders for this visit:  Coats' disease of left eye  -patient previously being followed by retina for a variant of COATS disease and neovascularization elsewhere (NVE) in the periphery treated with panretinal photocoagulation (PRP)  -on exam today no new signs of exudates or neovascularization elsewhere (NVE)  -optos photos taken to document  -dicussed findings with patient  Epiretinal membrane (ERM) of left eye  -stable on DFE. Monitor with OCT MAC  Myopia with presbyopia of both eyes  -New spec rx released today per patient request. Ocular health wnl for age OU. Monitor 1 year or sooner prn. Refraction billed today.  -Discussed proper wear, care, replacement of contact lenses. Gave handout. D/c cl wear and RTC if eyes become red, painful, irritated. Monitor 1 year.   CL eval billed today. $25 Finalized Biotrue contact lens (CL) with good comfort/fit/visual acuity (VA). Discussed that patient will have to potentially wear OTC NVO specs with contact lens (CL) on the computer.   Meibomian gland disease, unspecified laterality  -discussed using warm compresses daily    RTC 1 year for annual with MRX, contact lens (CL) exam, DFE, OCT MAC

## 2023-11-09 ENCOUNTER — HOSPITAL ENCOUNTER (OUTPATIENT)
Dept: RADIOLOGY | Facility: HOSPITAL | Age: 43
Discharge: HOME | End: 2023-11-09
Payer: COMMERCIAL

## 2023-11-09 DIAGNOSIS — Z12.31 SCREENING MAMMOGRAM FOR BREAST CANCER: ICD-10-CM

## 2023-11-09 PROCEDURE — 77063 BREAST TOMOSYNTHESIS BI: CPT | Performed by: RADIOLOGY

## 2023-11-09 PROCEDURE — 77067 SCR MAMMO BI INCL CAD: CPT

## 2023-11-09 PROCEDURE — 77067 SCR MAMMO BI INCL CAD: CPT | Performed by: RADIOLOGY

## 2023-11-14 ENCOUNTER — TELEPHONE (OUTPATIENT)
Dept: PRIMARY CARE | Facility: CLINIC | Age: 43
End: 2023-11-14
Payer: COMMERCIAL

## 2023-12-01 ENCOUNTER — APPOINTMENT (OUTPATIENT)
Dept: PRIMARY CARE | Facility: CLINIC | Age: 43
End: 2023-12-01
Payer: COMMERCIAL

## 2023-12-08 ENCOUNTER — TELEPHONE (OUTPATIENT)
Dept: OPHTHALMOLOGY | Facility: CLINIC | Age: 43
End: 2023-12-08
Payer: COMMERCIAL

## 2024-01-30 ENCOUNTER — OFFICE VISIT (OUTPATIENT)
Dept: PRIMARY CARE | Facility: CLINIC | Age: 44
End: 2024-01-30
Payer: COMMERCIAL

## 2024-01-30 VITALS
SYSTOLIC BLOOD PRESSURE: 143 MMHG | WEIGHT: 144 LBS | OXYGEN SATURATION: 97 % | BODY MASS INDEX: 30.1 KG/M2 | HEART RATE: 74 BPM | DIASTOLIC BLOOD PRESSURE: 96 MMHG | TEMPERATURE: 97.5 F

## 2024-01-30 DIAGNOSIS — M18.52 OTHER SECONDARY OSTEOARTHRITIS OF FIRST CARPOMETACARPAL JOINT OF LEFT HAND: Primary | ICD-10-CM

## 2024-01-30 PROCEDURE — 99213 OFFICE O/P EST LOW 20 MIN: CPT | Performed by: INTERNAL MEDICINE

## 2024-01-30 PROCEDURE — 1036F TOBACCO NON-USER: CPT | Performed by: INTERNAL MEDICINE

## 2024-01-30 ASSESSMENT — ENCOUNTER SYMPTOMS
DEPRESSION: 0
OCCASIONAL FEELINGS OF UNSTEADINESS: 0
LOSS OF SENSATION IN FEET: 0

## 2024-01-30 ASSESSMENT — PATIENT HEALTH QUESTIONNAIRE - PHQ9
2. FEELING DOWN, DEPRESSED OR HOPELESS: NOT AT ALL
SUM OF ALL RESPONSES TO PHQ9 QUESTIONS 1 AND 2: 0
1. LITTLE INTEREST OR PLEASURE IN DOING THINGS: NOT AT ALL

## 2024-01-30 ASSESSMENT — PAIN SCALES - GENERAL: PAINLEVEL: 6

## 2024-01-30 NOTE — PROGRESS NOTES
History of hypertension treated taking care of a sore    On amlodipine is working you find may be SVT 2 (medication list so I can compare he is asking the question stopping the medicationsSubjective   Patient ID: Nisa Kenny is a 43 y.o. female who presents for Joint Swelling (Left wrist and thumb x1 week).    HPI   43 years old female comes in to see me today complaining of severe pain on her left fingers metacarpal   phalangeal joint.  It has been few days to a week pain when she presses on it.  Very mildly swollen.  It looks like osteoarthritis.  I reassured her about that because she was not sure what is going on.  Happy to hear that.  Advised her how to take care of it and how to treat it.  Review of Systems  12 system review 12 system negative.  She denies trauma or injury to her thumb on the left okay thank  Objective   BP (!) 143/96 (BP Location: Right arm, Patient Position: Sitting, BP Cuff Size: Adult)   Pulse 74   Temp 36.4 °C (97.5 °F) (Temporal)   Wt 65.3 kg (144 lb)   SpO2 97%   BMI 30.10 kg/m²     Physical Exam  Alert oriented in no distress nonicteric sclera no jaundice.  Face symmetrical cranial nerves intact.  Neck supple no masses no lymph node thyromegaly or jugular venous distention.  Lungs clear no rales wheezing or crackles.  Heart normal S1 and S2 regular rhythm.  Abdomen benign nontender no masses no organomegaly.  Neurologically intact.  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

## 2024-02-09 ENCOUNTER — APPOINTMENT (OUTPATIENT)
Dept: PRIMARY CARE | Facility: CLINIC | Age: 44
End: 2024-02-09
Payer: COMMERCIAL

## 2024-03-22 ENCOUNTER — ANESTHESIA EVENT (OUTPATIENT)
Dept: GASTROENTEROLOGY | Facility: HOSPITAL | Age: 44
End: 2024-03-22
Payer: COMMERCIAL

## 2024-03-22 ENCOUNTER — HOSPITAL ENCOUNTER (OUTPATIENT)
Dept: GASTROENTEROLOGY | Facility: HOSPITAL | Age: 44
Setting detail: OUTPATIENT SURGERY
Discharge: HOME | End: 2024-03-22
Payer: COMMERCIAL

## 2024-03-22 ENCOUNTER — ANESTHESIA (OUTPATIENT)
Dept: GASTROENTEROLOGY | Facility: HOSPITAL | Age: 44
End: 2024-03-22
Payer: COMMERCIAL

## 2024-03-22 VITALS
WEIGHT: 142.86 LBS | DIASTOLIC BLOOD PRESSURE: 66 MMHG | RESPIRATION RATE: 14 BRPM | HEART RATE: 74 BPM | OXYGEN SATURATION: 99 % | SYSTOLIC BLOOD PRESSURE: 137 MMHG | BODY MASS INDEX: 29.99 KG/M2 | TEMPERATURE: 96.8 F | HEIGHT: 58 IN

## 2024-03-22 DIAGNOSIS — Z12.11 SCREENING FOR MALIGNANT NEOPLASM OF COLON: ICD-10-CM

## 2024-03-22 LAB — PREGNANCY TEST URINE, POC: NEGATIVE

## 2024-03-22 PROCEDURE — 7100000010 HC PHASE TWO TIME - EACH INCREMENTAL 1 MINUTE

## 2024-03-22 PROCEDURE — 7100000009 HC PHASE TWO TIME - INITIAL BASE CHARGE

## 2024-03-22 PROCEDURE — A45378 PR COLONOSCOPY,DIAGNOSTIC: Performed by: NURSE ANESTHETIST, CERTIFIED REGISTERED

## 2024-03-22 PROCEDURE — 3700000002 HC GENERAL ANESTHESIA TIME - EACH INCREMENTAL 1 MINUTE

## 2024-03-22 PROCEDURE — 2500000004 HC RX 250 GENERAL PHARMACY W/ HCPCS (ALT 636 FOR OP/ED): Performed by: NURSE ANESTHETIST, CERTIFIED REGISTERED

## 2024-03-22 PROCEDURE — 3700000001 HC GENERAL ANESTHESIA TIME - INITIAL BASE CHARGE

## 2024-03-22 PROCEDURE — 45378 DIAGNOSTIC COLONOSCOPY: CPT | Performed by: INTERNAL MEDICINE

## 2024-03-22 PROCEDURE — A45378 PR COLONOSCOPY,DIAGNOSTIC: Performed by: ANESTHESIOLOGY

## 2024-03-22 RX ORDER — MIDAZOLAM HYDROCHLORIDE 1 MG/ML
INJECTION, SOLUTION INTRAMUSCULAR; INTRAVENOUS AS NEEDED
Status: DISCONTINUED | OUTPATIENT
Start: 2024-03-22 | End: 2024-03-22

## 2024-03-22 RX ORDER — FENTANYL CITRATE 50 UG/ML
INJECTION, SOLUTION INTRAMUSCULAR; INTRAVENOUS AS NEEDED
Status: DISCONTINUED | OUTPATIENT
Start: 2024-03-22 | End: 2024-03-22

## 2024-03-22 RX ORDER — MIDAZOLAM HYDROCHLORIDE 5 MG/ML
INJECTION INTRAMUSCULAR; INTRAVENOUS AS NEEDED
Status: DISCONTINUED | OUTPATIENT
Start: 2024-03-22 | End: 2024-03-22

## 2024-03-22 RX ORDER — SODIUM CHLORIDE, SODIUM LACTATE, POTASSIUM CHLORIDE, CALCIUM CHLORIDE 600; 310; 30; 20 MG/100ML; MG/100ML; MG/100ML; MG/100ML
INJECTION, SOLUTION INTRAVENOUS CONTINUOUS PRN
Status: DISCONTINUED | OUTPATIENT
Start: 2024-03-22 | End: 2024-03-22

## 2024-03-22 RX ADMIN — MIDAZOLAM HYDROCHLORIDE 2 MG: 5 INJECTION, SOLUTION INTRAMUSCULAR; INTRAVENOUS at 14:17

## 2024-03-22 RX ADMIN — FENTANYL CITRATE 50 MCG: 50 INJECTION, SOLUTION INTRAMUSCULAR; INTRAVENOUS at 14:17

## 2024-03-22 RX ADMIN — MIDAZOLAM HYDROCHLORIDE 2 MG: 5 INJECTION, SOLUTION INTRAMUSCULAR; INTRAVENOUS at 14:15

## 2024-03-22 RX ADMIN — SODIUM CHLORIDE, SODIUM LACTATE, POTASSIUM CHLORIDE, AND CALCIUM CHLORIDE: .6; .31; .03; .02 INJECTION, SOLUTION INTRAVENOUS at 14:15

## 2024-03-22 ASSESSMENT — PAIN - FUNCTIONAL ASSESSMENT
PAIN_FUNCTIONAL_ASSESSMENT: 0-10

## 2024-03-22 ASSESSMENT — PAIN SCALES - GENERAL
PAINLEVEL_OUTOF10: 0 - NO PAIN
PAIN_LEVEL: 0
PAINLEVEL_OUTOF10: 0 - NO PAIN

## 2024-03-22 ASSESSMENT — COLUMBIA-SUICIDE SEVERITY RATING SCALE - C-SSRS
2. HAVE YOU ACTUALLY HAD ANY THOUGHTS OF KILLING YOURSELF?: NO
6. HAVE YOU EVER DONE ANYTHING, STARTED TO DO ANYTHING, OR PREPARED TO DO ANYTHING TO END YOUR LIFE?: NO
1. IN THE PAST MONTH, HAVE YOU WISHED YOU WERE DEAD OR WISHED YOU COULD GO TO SLEEP AND NOT WAKE UP?: NO

## 2024-03-22 NOTE — ANESTHESIA POSTPROCEDURE EVALUATION
Patient: Nisa Kenny    Procedure Summary       Date: 03/22/24 Room / Location: [facility]    Anesthesia Start: 1415 Anesthesia Stop: 1436    Procedure: COLONOSCOPY Diagnosis: Screening for malignant neoplasm of colon    Scheduled Providers: Mel Brooks MD; Emmanuel Liu MD; DYANA Gonzalez; Amber Lombardo, RN; Hannah Garcia RN Responsible Provider: DYANA Mosley    Anesthesia Type: MAC ASA Status: 3            Anesthesia Type: MAC    Vitals Value Taken Time   /72 03/22/24 1437   Temp 36 °C (96.8 °F) 03/22/24 1434   Pulse 84 03/22/24 1443   Resp 15 03/22/24 1434   SpO2 100 % 03/22/24 1443   Vitals shown include unvalidated device data.    Anesthesia Post Evaluation    Patient location during evaluation: bedside  Patient participation: complete - patient cannot participate  Level of consciousness: awake  Pain score: 0  Pain management: adequate  Airway patency: patent  Cardiovascular status: acceptable  Respiratory status: acceptable  Hydration status: acceptable  Postoperative Nausea and Vomiting: none        No notable events documented.

## 2024-03-22 NOTE — ANESTHESIA PREPROCEDURE EVALUATION
Patient: Nisa Kenny    Procedure Information       Date/Time: 03/22/24 1440    Scheduled providers: Mel Brooks MD; Emmanuel Liu MD; DYANA Gonzalez; Amber Lombardo, RN; Hannah Garcia RN    Procedure: COLONOSCOPY    Location: [facility]            Relevant Problems   Neuro/Psych   (+) Anxiety disorder      Pulmonary   (+) Asthma      Hematology   (+) Increase in antithrombin (CMS/HCC)       Clinical information reviewed:   Tobacco  Allergies  Meds   Med Hx  Surg Hx  OB Status  Fam Hx  Soc   Hx         Past Medical History:   Diagnosis Date    Asthma     Atopic eczema     Personal history of transient ischemic attack (TIA), and cerebral infarction without residual deficits     Status post repair of total anomalous pulmonary venous connection     Age 1    Vitreomacular adhesion, 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    Vitreomacular traction      Past Surgical History:   Procedure Laterality Date    CARDIAC SURGERY  1978    correction total anomalous pulmonary venous connection    HERNIA REPAIR      HYSTEROSCOPY W/ POLYPECTOMY  2022    KNEE LIGAMENT RECONSTRUCTION Left 2016    MYOMECTOMY  05/12/2023    laparoscopic    UMBILICAL HERNIA REPAIR      WISDOM TOOTH EXTRACTION       Social History     Tobacco Use    Smoking status: Never    Smokeless tobacco: Never   Vaping Use    Vaping Use: Never used   Substance Use Topics    Alcohol use: Yes     Alcohol/week: 2.0 standard drinks of alcohol     Types: 2 Glasses of wine per week    Drug use: Never      Current Outpatient Medications   Medication Instructions    acetaminophen/pyrilam/pamabrom (MIDOL PMS MAXIMUM STRENGTH ORAL) Take by mouth.    Advair Diskus 250-50 mcg/dose diskus inhaler TAKE 1 PUFF TWICE DAILY AND GARGLE AFTER UE    albuterol 2.5 mg /3 mL (0.083 %) nebulizer solution USE 1 UNIT DOSE IN NEBULIZER EVERY 4 TO 6 HOURS AS NEEDED.    cholecalciferol (Vitamin D-3) 50 MCG (2000 UT) tablet 1 tablet, oral, Daily    EPINEPHrine  "(EPIPEN-JR) 1.5 mg, injection, Once, Call 911 after use.    EPINEPHrine 0.3 mg/0.3 mL injection syringe inject the contents of 1 pen intramuscularly as directed x 1 dose- call 911 after use    ferrous sulfate (IRON ORAL) Iron TABS  Refills: 0    fluticasone (Flonase) 50 mcg/actuation nasal spray USE 1 TO 2 SPRAYS EACH NOSTRIL TWICE DAILY    ibuprofen 600 mg, oral, 4 times daily    ipratropium-albuteroL (Duo-Neb) 0.5-2.5 mg/3 mL nebulizer solution INHALE 1 UNIT DOSE Every 6 hours PRN SHort of breath    montelukast (Singulair) 10 mg tablet 1 tablet, oral, Nightly    PNV no.95/ferrous fum/folic ac (PRENATAL ORAL) Prenatal TABS  Refills: 0    tranexamic acid (Lysteda) 650 mg tablet tablet Take 1 tab by mouth BID for heavy menstrual bleeding. Do not exceed 5 days of the medication.      Allergies   Allergen Reactions    Peanut Swelling    Shellfish Containing Products Anaphylaxis    Shellfish Derived Anaphylaxis    Hay Fever And Allergy Relief Runny nose    Nut Flavor Hives    Seasonale (91) [Levonorgestrel-Ethinyl Estrad] Runny nose    Tree Nuts Hives and Angioedema    Latex Rash        Chemistry    Lab Results   Component Value Date/Time     (L) 10/03/2023 1629    K 3.8 10/03/2023 1629     10/03/2023 1629    CO2 26 10/03/2023 1629    BUN 14 10/03/2023 1629    CREATININE 0.78 10/03/2023 1629    Lab Results   Component Value Date/Time    CALCIUM 9.2 10/03/2023 1629    ALKPHOS 59 10/03/2023 1629    AST 16 10/03/2023 1629    ALT 14 10/03/2023 1629    BILITOT 0.3 10/03/2023 1629          Lab Results   Component Value Date    HGBA1C 5.5 07/01/2023     Lab Results   Component Value Date/Time    WBC 5.6 10/03/2023 1629    HGB 11.9 (L) 10/03/2023 1629    HCT 38.4 10/03/2023 1629     10/03/2023 1629     Lab Results   Component Value Date/Time    PROTIME 11.7 02/07/2023 1234    INR 1.0 02/07/2023 1234     No results found for: \"ABORH\"  No results found for this or any previous visit (from the past 4464 " hour(s)).  No results found for this or any previous visit from the past 1095 days.  Echo 12/23/2022:   Left Ventricle: The left ventricular systolic function is normal, with an estimated ejection fraction of 55-60%. The calculated ejection fraction is normal at 59 % using the Horton's Bi-plane MOD calculation. There are no regional wall motion abnormalities. The left ventricular cavity size is decreased. The left ventricular septal wall thickness is normal. There is normal left ventricular posterior wall thickness. Spectral Doppler shows a normal pattern of left ventricular diastolic filling.  Left Atrium: The left atrium is normal in size.  Right Ventricle: The right ventricle is normal in size. There is low normal right ventricular systolic function. TAPSE Mildly Reduced at 14 mm. S' is Low Normal at 7.  Right Atrium: The right atrium is upper limits of normal in size.  Aortic Valve: The aortic valve is probably trileaflet. There is no evidence of aortic valve regurgitation. The peak instantaneous gradient of the aortic valve is 9.1 mmHg. The mean gradient of the aortic valve is 4.0 mmHg.  Mitral Valve: The mitral valve is mildly thickened. There is evidence of mild mitral valve stenosis. The doppler estimated mean and peak diastolic pressure gradients are 2.0 mmHg and 5.3 mmHg respectively. There is trace mitral valve regurgitation.  Tricuspid Valve: The tricuspid valve is structurally normal. There is mild tricuspid regurgitation. The Doppler estimated RVSP is mildly elevated at 37.1 mmHg.  Pulmonic Valve: The pulmonic valve is structurally normal. There is no indication of pulmonic valve regurgitation.  Pericardium: There is no pericardial effusion noted.  Aorta: The aortic root is normal. There is no dilatation of the ascending aorta. There is no dilatation of the aortic root.  Systemic Veins: The inferior vena cava size appears small. There is IVC inspiratory collapse greater than 50%.  In comparison to the  "previous echocardiogram(s): Compared with study from 8/17/2015,. LVEF remains stable. The RVSP has increased from prior study. RV Function is low-Normal.        CONCLUSIONS:   1. Left ventricular systolic function is normal with a 55-60% estimated ejection fraction.   2. There is low normal right ventricular systolic function.   3. There is mild mitral valve stenosis.   4. Mildly elevated RVSP.   5. Left ventricular cavity size is decreased.   6. LVEF remains stable. The RVSP has increased from prior study. RV Function is low-Normal.   7. Consider Congenital Cardiology Referral as indicated.    Visit Vitals  /83   Pulse 75   Temp 36.6 °C (97.9 °F)   Resp 18   Ht 1.473 m (4' 10\")   Wt 64.8 kg (142 lb 13.7 oz)   LMP 03/01/2024   SpO2 100%   BMI 29.86 kg/m²   OB Status Having periods   Smoking Status Never   BSA 1.63 m²     NPO/Void Status  Carbohydrate Drink Given Prior to Surgery? : N  Date of Last Liquid: 03/22/24  Time of Last Liquid: 0840  Date of Last Solid: 03/21/24  Time of Last Solid: 0900  Last Intake Type: Clear fluids  Time of Last Void: 1340         Physical Exam    Airway  Mallampati: III  TM distance: >3 FB  Neck ROM: full     Cardiovascular   Rhythm: regular  Rate: normal     Dental - normal exam     Pulmonary   Breath sounds clear to auscultation     Abdominal - normal exam              Anesthesia Plan    History of general anesthesia?: yes  History of complications of general anesthesia?: no    ASA 3     MAC     intravenous induction   Anesthetic plan and risks discussed with patient.    Plan discussed with CRNA and CAA.        "

## 2024-03-22 NOTE — H&P
"History Of Present Illness  Nisa Kenny is a 43 y.o. female presenting with screening.     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 She has never used smokeless tobacco. She reports current alcohol use of about 2.0 standard drinks of alcohol per week. She reports that she does not use drugs.    Family History  Family History   Problem Relation Name Age of Onset    Other (narrow angle glaucoma) Mother      Hypertension Mother      Cancer Mother      No Known Problems Father      Cancer Maternal Grandmother      Glaucoma Maternal Grandfather      Hypertension Maternal Grandfather      Breast cancer Mother's Sister  67        Allergies  Allergies   Allergen Reactions    Peanut Swelling    Shellfish Containing Products Anaphylaxis    Shellfish Derived Anaphylaxis    Hay Fever And Allergy Relief Runny nose    Nut Flavor Hives    Seasonale (91) [Levonorgestrel-Ethinyl Estrad] Runny nose    Tree Nuts Hives and Angioedema    Latex Rash     Review of Systems     Physical Exam     Last Recorded Vitals  Blood pressure 146/83, pulse 75, temperature 36.6 °C (97.9 °F), resp. rate 18, height 1.473 m (4' 10\"), weight 64.8 kg (142 lb 13.7 oz), last menstrual period 03/01/2024, SpO2 100 %.    Assessment/Plan "   colonoscopy     Mel Brooks MD

## 2024-03-25 ENCOUNTER — TELEPHONE (OUTPATIENT)
Dept: PRIMARY CARE | Facility: CLINIC | Age: 44
End: 2024-03-25
Payer: COMMERCIAL

## 2024-03-25 NOTE — TELEPHONE ENCOUNTER
----- Message from Mayo Carrasco MD sent at 3/23/2024  3:02 PM EDT -----  Regarding: r  Normal coloscopy.  Will be due in 10 years in 2034

## 2024-03-25 NOTE — ADDENDUM NOTE
Encounter addended by: Mariam Loyd RN on: 3/25/2024 9:33 AM   Actions taken: Flowsheet accepted, Contacts section saved

## 2024-07-08 DIAGNOSIS — J45.909 ASTHMA, UNSPECIFIED ASTHMA SEVERITY, UNSPECIFIED WHETHER COMPLICATED, UNSPECIFIED WHETHER PERSISTENT (HHS-HCC): Primary | ICD-10-CM

## 2024-07-08 RX ORDER — ALBUTEROL SULFATE 0.83 MG/ML
SOLUTION RESPIRATORY (INHALATION)
Qty: 75 ML | Refills: 11 | Status: SHIPPED | OUTPATIENT
Start: 2024-07-08

## 2024-07-08 RX ORDER — MONTELUKAST SODIUM 10 MG/1
10 TABLET ORAL NIGHTLY
Qty: 30 TABLET | Refills: 11 | Status: SHIPPED | OUTPATIENT
Start: 2024-07-08

## 2024-07-11 ENCOUNTER — TELEMEDICINE (OUTPATIENT)
Dept: PRIMARY CARE | Facility: CLINIC | Age: 44
End: 2024-07-11
Payer: COMMERCIAL

## 2024-07-11 DIAGNOSIS — J32.9 OTHER SINUSITIS, UNSPECIFIED CHRONICITY: Primary | ICD-10-CM

## 2024-07-11 DIAGNOSIS — R05.9 COUGH IN ADULT: ICD-10-CM

## 2024-07-11 PROCEDURE — 99441 PR PHYS/QHP TELEPHONE EVALUATION 5-10 MIN: CPT | Performed by: INTERNAL MEDICINE

## 2024-07-11 RX ORDER — BENZONATATE 200 MG/1
200 CAPSULE ORAL 3 TIMES DAILY PRN
Qty: 30 CAPSULE | Refills: 1 | Status: SHIPPED | OUTPATIENT
Start: 2024-07-11 | End: 2024-08-10

## 2024-07-11 RX ORDER — AZITHROMYCIN 250 MG/1
TABLET, FILM COATED ORAL
Qty: 6 TABLET | Refills: 0 | Status: SHIPPED | OUTPATIENT
Start: 2024-07-11 | End: 2024-07-16

## 2024-07-11 NOTE — PROGRESS NOTES
I had a telephone conversation and virtual visit with [facility] today.  She has been having problem with tendinitis , for 1 week she has been coughing sick material, with sinus drainage stuffiness in her nose and pain surrounding her face from her sinuses.  Her temperature at the urgent care she visited yesterday was 100.5.  She had COVID test negative.  Her blood pressure was very elevated systolic 170 because she was taking Sudafed.  We agreed to treat her with cough medication and antibiotic for now for what seems or sounds like sinus infection.  She agreed to that.  Encouraged her to drink a lot of liquids water and tea and soup.  If no improvement please call me or let me know.

## 2024-07-11 NOTE — PROGRESS NOTES
Subjective   Patient ID: Nisa Kenny is a 43 y.o. female who presents for No chief complaint on file..    HPI     Review of Systems    Objective   There were no vitals taken for this visit.    Physical Exam    Assessment/Plan

## 2024-07-26 ENCOUNTER — OFFICE VISIT (OUTPATIENT)
Dept: PRIMARY CARE | Facility: CLINIC | Age: 44
End: 2024-07-26
Payer: COMMERCIAL

## 2024-07-26 VITALS
WEIGHT: 145 LBS | DIASTOLIC BLOOD PRESSURE: 106 MMHG | TEMPERATURE: 97.9 F | SYSTOLIC BLOOD PRESSURE: 151 MMHG | HEART RATE: 73 BPM | BODY MASS INDEX: 30.31 KG/M2 | OXYGEN SATURATION: 99 %

## 2024-07-26 DIAGNOSIS — D64.9 ANEMIA, UNSPECIFIED TYPE: Primary | ICD-10-CM

## 2024-07-26 DIAGNOSIS — G89.29 WRIST PAIN, CHRONIC, LEFT: ICD-10-CM

## 2024-07-26 DIAGNOSIS — M25.532 WRIST PAIN, CHRONIC, LEFT: ICD-10-CM

## 2024-07-26 DIAGNOSIS — R53.83 FATIGUE, UNSPECIFIED TYPE: ICD-10-CM

## 2024-07-26 DIAGNOSIS — E78.2 MIXED HYPERLIPIDEMIA: ICD-10-CM

## 2024-07-26 DIAGNOSIS — I10 HYPERTENSION, UNSPECIFIED TYPE: ICD-10-CM

## 2024-07-26 PROCEDURE — 3080F DIAST BP >= 90 MM HG: CPT | Performed by: INTERNAL MEDICINE

## 2024-07-26 PROCEDURE — 3077F SYST BP >= 140 MM HG: CPT | Performed by: INTERNAL MEDICINE

## 2024-07-26 PROCEDURE — 99214 OFFICE O/P EST MOD 30 MIN: CPT | Performed by: INTERNAL MEDICINE

## 2024-07-26 PROCEDURE — 1036F TOBACCO NON-USER: CPT | Performed by: INTERNAL MEDICINE

## 2024-07-26 ASSESSMENT — PATIENT HEALTH QUESTIONNAIRE - PHQ9
SUM OF ALL RESPONSES TO PHQ9 QUESTIONS 1 AND 2: 0
2. FEELING DOWN, DEPRESSED OR HOPELESS: NOT AT ALL
1. LITTLE INTEREST OR PLEASURE IN DOING THINGS: NOT AT ALL

## 2024-07-26 ASSESSMENT — ENCOUNTER SYMPTOMS
OCCASIONAL FEELINGS OF UNSTEADINESS: 0
DEPRESSION: 0
LOSS OF SENSATION IN FEET: 0

## 2024-07-26 ASSESSMENT — PAIN SCALES - GENERAL: PAINLEVEL: 6

## 2024-07-26 NOTE — PROGRESS NOTES
Subjective   Patient ID: Nisa Kenny is a 43 y.o. female who presents for Follow-up (Left hand/ wrist and thumb pain since 01/2024).    HPI   43 years old female comes in to see me today complaining of pain and tenderness on her left wrist and hand.  Pain is chronic ,  been going on since early this year.  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  She wears splint I gave her a month ago to protect her left wrist and thumb.  The pain is basically at the base of her left thumb area down to the wrist all of it on the lateral aspect of the hand and arm.  We agreed to consult  hand surgeon.  Referral form provided she denies any history of trauma falls or accident.  Review of Systems    Objective   BP (!) 151/106 (BP Location: Left arm, Patient Position: Sitting, BP Cuff Size: Adult)   Pulse 73   Temp 36.6 °C (97.9 °F) (Temporal)   Wt 65.8 kg (145 lb)   SpO2 99%   BMI 30.31 kg/m²     Physical Exam  Alert oriented in no distress.  Nonicteric sclera or jaundice.  Face symmetrical cranial nerves intact.  Neck supple no masses no lymph node no thyromegaly or jugular venous distention.  Lungs clear no rales wheezing or crackles.  Heart normal S1 and S2 regular rhythm.  Abdomen benign neurologically intact.  Examination of the left wrist and hand reveals tenderness and pain when palpating the lateral aspect at the base of the left thumb.  Normal range of motion of all fingers.  No tingling or numbness.  Mostly tenderness and mostly pain.  Assessment/Plan   Diagnoses and all orders for this visit:  Anemia, unspecified type  -     CBC  Mixed hyperlipidemia  -     Lipid Panel  Fatigue, unspecified type  -     Thyroid Stimulating Hormone  Hypertension, unspecified type  -     Comprehensive Metabolic Panel  Wrist pain, chronic, left  -     Referral to Orthopaedic Surgery; Future

## 2024-08-05 ENCOUNTER — TELEPHONE (OUTPATIENT)
Dept: PRIMARY CARE | Facility: CLINIC | Age: 44
End: 2024-08-05
Payer: COMMERCIAL

## 2024-08-05 NOTE — TELEPHONE ENCOUNTER
----- Message from Mayo Carrasco sent at 8/3/2024 11:27 AM EDT -----  Regarding: r  Lab result normal visit showed normal kidney function and normal liver function.  Normal thyroid and urine blood count slight anemia, make sure you eat enough iron in your food celery spinach meat beans etc. I will watch your blood count.  Your cholesterol is high 205 should be below 200.  Your bad cholesterol LDL is high 127 should be below 100.  Eat low-fat diet low carbohydrate diet lose weight stay active and consider taking a statin to bring down your cholesterol any question let me know

## 2024-08-16 ENCOUNTER — APPOINTMENT (OUTPATIENT)
Dept: ORTHOPEDIC SURGERY | Facility: HOSPITAL | Age: 44
End: 2024-08-16
Payer: COMMERCIAL

## 2024-08-30 ENCOUNTER — HOSPITAL ENCOUNTER (OUTPATIENT)
Dept: RADIOLOGY | Facility: HOSPITAL | Age: 44
Discharge: HOME | End: 2024-08-30
Payer: COMMERCIAL

## 2024-08-30 ENCOUNTER — APPOINTMENT (OUTPATIENT)
Dept: RADIOLOGY | Facility: HOSPITAL | Age: 44
End: 2024-08-30
Payer: COMMERCIAL

## 2024-08-30 ENCOUNTER — OFFICE VISIT (OUTPATIENT)
Dept: ORTHOPEDIC SURGERY | Facility: HOSPITAL | Age: 44
End: 2024-08-30
Payer: COMMERCIAL

## 2024-08-30 DIAGNOSIS — G89.29 WRIST PAIN, CHRONIC, LEFT: ICD-10-CM

## 2024-08-30 DIAGNOSIS — M65.4 DE QUERVAIN'S TENOSYNOVITIS, LEFT: ICD-10-CM

## 2024-08-30 DIAGNOSIS — M25.532 LEFT WRIST PAIN: ICD-10-CM

## 2024-08-30 DIAGNOSIS — M25.532 WRIST PAIN, CHRONIC, LEFT: ICD-10-CM

## 2024-08-30 PROCEDURE — 99203 OFFICE O/P NEW LOW 30 MIN: CPT | Performed by: ORTHOPAEDIC SURGERY

## 2024-08-30 PROCEDURE — 1036F TOBACCO NON-USER: CPT | Performed by: ORTHOPAEDIC SURGERY

## 2024-08-30 PROCEDURE — 99213 OFFICE O/P EST LOW 20 MIN: CPT | Performed by: ORTHOPAEDIC SURGERY

## 2024-08-30 PROCEDURE — 73110 X-RAY EXAM OF WRIST: CPT | Mod: LT

## 2024-08-30 NOTE — LETTER
September 3, 2024     Mayo Carrasco MD  [address] [facility] [id number]    Patient: Nisa Kenny   YOB: 1980   Date of Visit: 8/30/2024       Dear Dr. Mayo Carrasco MD:    Thank you for referring Nisa Kenny to me for evaluation. Below are my notes for this consultation.  If you have questions, please do not hesitate to call me. I look forward to following your patient along with you.       Sincerely,     Tyshawn Titus MD      CC: No Recipients  ______________________________________________________________________________________    CHIEF COMPLAINT   Left wrist pain    ASSESSMENT + PLAN    Left De Quervain's tenosynovitis    The nature of de Quervain's tendinitis was reviewed, along with the natural history.  I reviewed the options for management, including observation, nonsteroidals, splinting, oral steroids, cortisone injection, or surgical release, along with the likely success rates of each.  The patient wanted to try a course of splinting and 2 weeks of a nonsteroidal pulse.  I reviewed stomach warnings for the nonsteroidals, and described the correct splint.  Activity modification suggestions were given.    Patient will followup in 4 weeks, if still symptomatic, to discuss cortisone injection or surgical decompression.    HISTORY OF PRESENT ILLNESS  Patient is a 44 y.o. right-hand dominant female  at [employer] who presents today for evaluation of left wrist pain. 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 It is along the radial wrist and extends to mid forearm.  It is sharp in character.  No popping, clicking, or instability.  No similar problems on the other side.  She was provided with a simple wrist splint by her PCP for presumed arthritis with little relief in symptoms.    She is not diabetic or hypothyroid.  She does not smoke.    REVIEW OF SYSTEMS  A 30-item multi-system Review Of Systems was obtained on today's intake form.  This was  reviewed with the patient and is correct.  The pertinent positives and negatives are listed above.  The form has been scanned separately into the medical record.      PHYSICAL EXAM    Constitutional:    Appears stated age. Well-developed and well-nourished obese female in no acute distress.  Psychiatric:         Pleasant normal mood and affect. Behavior is appropriate for the situation.   Head:                   Normocephalic and atraumatic.  Eyes:                    Pupils are equal and round.  Cardiovascular:  2+ radial and ulnar pulses. Fingers well-perfused.  Respiratory:        Effort normal. No respiratory distress. Speaking in complete sentences.  Neurologic:       Alert and oriented to person, place, and time.  Skin:                Skin is intact, warm and dry.  Hematologic / Lymphatic:    No lymphedema or lymphangitis.    Extremities / Musculoskeletal:  Left upper extremity:  - Positive Finkelstein test.  Positive wrist flexion-thumb extension test, reproducing chief complaint.  - Motor and sensation intact M/U/R/AIN/PIN distributions  - Palpable radial pulse  - Cap refill < 2 seconds in all digits negative Grider.  Negative midcarpal shift.    IMAGING / LABS / EMGs  X-rays left wrist ordered and independently interpreted by me today show no acute fracture, subluxation, or foreign body.  Joint spaces are concentric and well-preserved.  There is increased soft tissue density in the area of the first dorsal compartment, but no abnormal calcifications.    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 Sig Documenting Provider Last Dose Status   acetaminophen/pyrilam/pamabrom (MIDOL PMS MAXIMUM STRENGTH ORAL) [id number] Yes Take by mouth. Junior Adames MD Taking Active   Advair Diskus 250-50 mcg/dose diskus inhaler [id number] Yes TAKE 1 PUFF TWICE DAILY AND GARGLE AFTER UE Historical MD Zacarias Taking Active   albuterol 2.5 mg /3 mL (0.083 %) nebulizer solution [id number] Yes USE 1 UNIT DOSE IN NEBULIZER EVERY 4 TO 6 HOURS AS NEEDED. Rod Flores MD MPH Taking Active   benzonatate (Tessalon) 200 mg capsule [id number] Yes Take 1 capsule (200 mg) by mouth 3 times a day as needed for cough. Do not crush or chew. Mayo Carrasco MD Taking Active   cholecalciferol (Vitamin D-3) 50 MCG ( UT) tablet [id number] Yes Take 1 tablet (2,000 Units) by mouth once daily. Historical Provider, MD Taking Active   EPINEPHrine (Epipen-JR) 0.15 mg/0.3 mL injection syringe [id number]  Inject 3 mL (1.5 mg) as directed 1 time for 1 dose. Call 911 after use. aMyo Carrasco MD   23 [id number]   EPINEPHrine 0.3 mg/0.3 mL injection syringe [id number] Yes inject the contents of 1 pen intramuscularly as directed x 1 dose- call 911 after use Junior Adames MD Taking Active   ferrous sulfate (IRON ORAL) [id number] Yes Iron TABS  Refills: 0 Junior Adames MD Taking Active   fluticasone (Flonase) 50 mcg/actuation nasal spray [id number] Yes USE 1 TO 2 SPRAYS EACH NOSTRIL TWICE DAILY Junior Adames MD Taking Active   ibuprofen 600 mg tablet [id number] Yes Take 1 tablet (600 mg) by mouth 4 times a day. Junior Adames MD Taking Active   ipratropium-albuteroL (Duo-Neb) 0.5-2.5 mg/3 mL nebulizer solution [id number] Yes INHALE 1 UNIT DOSE Every 6 hours PRN SHort of breath Junior Adames MD Taking Active   montelukast (Singulair) 10 mg tablet [id number] Yes Take 1 tablet (10 mg) by mouth once daily at bedtime. Rod Flores MD MPH Taking Active   PNV no.95/ferrous fum/folic ac (PRENATAL ORAL)  [id number] Yes Prenatal TABS  Refills: 0 Historical Provider, MD Taking Active   tranexamic acid (Lysteda) 650 mg tablet tablet [id number] Yes Take 1 tab by mouth BID for heavy menstrual bleeding. Do not exceed 5 days of the medication. Historical Provider, MD Taking Active                    Allergies   Allergen Reactions   • Peanut Swelling   • Shellfish Containing Products Anaphylaxis   • Shellfish Derived Anaphylaxis   • Hay Fever And Allergy Relief Runny nose   • Nut Flavor Hives   • Seasonale (91) [Levonorgestrel-Ethinyl Estrad] Runny nose   • Tree Nuts Hives and Angioedema   • Latex Rash       Social History     Socioeconomic History   • Marital status: Single     Spouse name: Not on file   • Number of children: Not on file   • Years of education: Not on file   • Highest education level: Not on file   Occupational History   • Not on file   Tobacco Use   • Smoking status: Never   • Smokeless tobacco: Never   Vaping Use   • Vaping status: Never Used   Substance and Sexual Activity   • Alcohol use: Yes     Alcohol/week: 2.0 standard drinks of alcohol     Types: 2 Glasses of wine per week   • Drug use: Never   • Sexual activity: Defer   Other Topics Concern   • Not on file   Social History Narrative   • Not on file     Social Determinants of Health     Financial Resource Strain: Low Risk  (11/6/2021)    Received from [facility]    Overall Financial Resource Strain (CARDIA)    • Difficulty of Paying Living Expenses: Not very hard   Food Insecurity: No Food Insecurity (11/6/2021)    Received from [facility]    Hunger Vital Sign    • Worried About Running Out of Food in the Last Year: Never true    • Ran Out of Food in the Last Year: Never true   Transportation Needs: No Transportation Needs (11/6/2021)    Received from [facility]    PRAPARE - Transportation    • Lack of Transportation (Medical): No    • Lack of Transportation (Non-Medical): No   Physical Activity:  Insufficiently Active (11/6/2021)    Received from [facility]    Exercise Vital Sign    • Days of Exercise per Week: 1 day    • Minutes of Exercise per Session: 30 min   Stress: Stress Concern Present (11/6/2021)    Received from [facility]    [place] [employer] - Occupational Stress Questionnaire    • Feeling of Stress : To some extent   Social Connections: Moderately Integrated (11/6/2021)    Received from [facility]    Social Connection and Isolation Panel [NHANES]    • Frequency of Communication with Friends and Family: Three times a week    • Frequency of Social Gatherings with Friends and Family: Once a week    • Attends [employer] Services: More than 4 times per year    • Active Member of Clubs or Organizations: Yes    • Attends Club or Organization Meetings: More than 4 times per year    • Marital Status: Never    Intimate Partner Violence: Not At Risk (11/6/2021)    Received from [facility]    Humiliation, Afraid, Rape, and Kick questionnaire    • Fear of Current or Ex-Partner: No    • Emotionally Abused: No    • Physically Abused: No    • Sexually Abused: No   Housing Stability: Low Risk  (11/6/2021)    Received from [facility]    Housing Stability Vital Sign    • Unable to Pay for Housing in the Last Year: No    • Number of Places Lived in the Last Year: 1    • Unstable Housing in the Last Year: No       Past Surgical History:   Procedure Laterality Date   • CARDIAC SURGERY  1978    correction total anomalous pulmonary venous connection   • HERNIA REPAIR     • HYSTEROSCOPY W/ POLYPECTOMY  2022   • KNEE LIGAMENT RECONSTRUCTION Left 2016   • MYOMECTOMY  05/12/2023    laparoscopic   • UMBILICAL HERNIA REPAIR     • WISDOM TOOTH EXTRACTION         ATTESTATION    I saw and evaluated the patient. I personally obtained the key and critical portions of the history and physical exam or was physically present for key  and critical portions performed by the resident/fellow. I reviewed the resident/fellow's documentation and discussed the patient with the resident/fellow. I agree with the resident/fellow's medical decision making as documented in the note.        Electronically signed  ROULA Titus MD  [phone]

## 2024-08-30 NOTE — PROGRESS NOTES
CHIEF COMPLAINT   Left wrist pain    ASSESSMENT + PLAN    Left De Quervain's tenosynovitis    The nature of de Quervain's tendinitis was reviewed, along with the natural history.  I reviewed the options for management, including observation, nonsteroidals, splinting, oral steroids, cortisone injection, or surgical release, along with the likely success rates of each.  The patient wanted to try a course of splinting and 2 weeks of a nonsteroidal pulse.  I reviewed stomach warnings for the nonsteroidals, and described the correct splint.  Activity modification suggestions were given.    Patient will followup in 4 weeks, if still symptomatic, to discuss cortisone injection or surgical decompression.    HISTORY OF PRESENT ILLNESS  Patient is a 44 y.o. right-hand dominant female  at [facility] who presents today for evaluation of left wrist pain. 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 It is along the radial wrist and extends to mid forearm.  It is sharp in character.  No popping, clicking, or instability.  No similar problems on the other side.  She was provided with a simple wrist splint by her PCP for presumed arthritis with little relief in symptoms.    She is not diabetic or hypothyroid.  She does not smoke.    REVIEW OF SYSTEMS  A 30-item multi-system Review Of Systems was obtained on today's intake form.  This was reviewed with the patient and is correct.  The pertinent positives and negatives are listed above.  The form has been scanned separately into the medical record.      PHYSICAL EXAM    Constitutional:    Appears stated age. Well-developed and well-nourished obese female in no acute distress.  Psychiatric:         Pleasant normal mood and affect. Behavior is appropriate for the situation.   Head:                   Normocephalic and atraumatic.  Eyes:                    Pupils are equal and round.  Cardiovascular:  2+ radial and ulnar pulses. Fingers  well-perfused.  Respiratory:        Effort normal. No respiratory distress. Speaking in complete sentences.  Neurologic:       Alert and oriented to person, place, and time.  Skin:                Skin is intact, warm and dry.  Hematologic / Lymphatic:    No lymphedema or lymphangitis.    Extremities / Musculoskeletal:  Left upper extremity:  - Positive Finkelstein test.  Positive wrist flexion-thumb extension test, reproducing chief complaint.  - Motor and sensation intact M/U/R/AIN/PIN distributions  - Palpable radial pulse  - Cap refill < 2 seconds in all digits negative Grider.  Negative midcarpal shift.    IMAGING / LABS / EMGs  X-rays left wrist ordered and independently interpreted by me today show no acute fracture, subluxation, or foreign body.  Joint spaces are concentric and well-preserved.  There is increased soft tissue density in the area of the first dorsal compartment, but no abnormal calcifications.    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 Sig Documenting Provider Last Dose Status   acetaminophen/pyrilam/pamabrom (MIDOL PMS MAXIMUM STRENGTH ORAL) [id number] Yes Take by mouth. Historical Provider, MD Taking Active   Advair Diskus 250-50 mcg/dose diskus inhaler [id number] Yes TAKE 1 PUFF TWICE DAILY AND GARGLE AFTER UE Historical Provider, MD Taking Active   albuterol 2.5 mg /3 mL (0.083 %) nebulizer solution [id number] Yes USE 1 UNIT DOSE IN NEBULIZER EVERY 4 TO 6 HOURS AS NEEDED. Rod Flores MD MPH Taking Active   benzonatate (Tessalon) 200 mg capsule [id number]  Yes Take 1 capsule (200 mg) by mouth 3 times a day as needed for cough. Do not crush or chew. Mayo Carrasco MD Taking Active   cholecalciferol (Vitamin D-3) 50 MCG ( UT) tablet [id number] Yes Take 1 tablet (2,000 Units) by mouth once daily. Historical Provider, MD Taking Active   EPINEPHrine (Epipen-JR) 0.15 mg/0.3 mL injection syringe [id number]  Inject 3 mL (1.5 mg) as directed 1 time for 1 dose. Call 911 after use. Mayo Carrasco MD   23 [id number]   EPINEPHrine 0.3 mg/0.3 mL injection syringe [id number] Yes inject the contents of 1 pen intramuscularly as directed x 1 dose- call 911 after use Historical Provider, MD Taking Active   ferrous sulfate (IRON ORAL) [id number] Yes Iron TABS  Refills: 0 Historical MD Zacarias Taking Active   fluticasone (Flonase) 50 mcg/actuation nasal spray [id number] Yes USE 1 TO 2 SPRAYS EACH NOSTRIL TWICE DAILY Historical MD Zacarias Taking Active   ibuprofen 600 mg tablet [id number] Yes Take 1 tablet (600 mg) by mouth 4 times a day. Historical Provider, MD Taking Active   ipratropium-albuteroL (Duo-Neb) 0.5-2.5 mg/3 mL nebulizer solution [id number] Yes INHALE 1 UNIT DOSE Every 6 hours PRN SHort of breath Historical Provider, MD Taking Active   montelukast (Singulair) 10 mg tablet [id number] Yes Take 1 tablet (10 mg) by mouth once daily at bedtime. Rod Flores MD MPH Taking Active   PNV no.95/ferrous fum/folic ac (PRENATAL ORAL) [id number] Yes Prenatal TABS  Refills: 0 Historical Provider, MD Taking Active   tranexamic acid (Lysteda) 650 mg tablet tablet [id number] Yes Take 1 tab by mouth BID for heavy menstrual bleeding. Do not exceed 5 days of the medication. Historical Provider, MD Taking Active                    Allergies   Allergen Reactions    Peanut Swelling    Shellfish Containing Products Anaphylaxis    Shellfish Derived Anaphylaxis    Hay Fever And Allergy Relief Runny nose    Nut Flavor Hives    Seasonale (91) [Levonorgestrel-Ethinyl Estrad] Runny nose    Tree Nuts Hives and  Angioedema    Latex Rash       Social History     Socioeconomic History    Marital status: Single     Spouse name: Not on file    Number of children: Not on file    Years of education: Not on file    Highest education level: Not on file   Occupational History    Not on file   Tobacco Use    Smoking status: Never    Smokeless tobacco: Never   Vaping Use    Vaping status: Never Used   Substance and Sexual Activity    Alcohol use: Yes     Alcohol/week: 2.0 standard drinks of alcohol     Types: 2 Glasses of wine per week    Drug use: Never    Sexual activity: Defer   Other Topics Concern    Not on file   Social History Narrative    Not on file     Social Determinants of Health     Financial Resource Strain: Low Risk  (11/6/2021)    Received from [employer]    Overall Financial Resource Strain (CARDIA)     Difficulty of Paying Living Expenses: Not very hard   Food Insecurity: No Food Insecurity (11/6/2021)    Received from [employer]    Hunger Vital Sign     Worried About Running Out of Food in the Last Year: Never true     Ran Out of Food in the Last Year: Never true   Transportation Needs: No Transportation Needs (11/6/2021)    Received from [employer]    PRAPARE - Transportation     Lack of Transportation (Medical): No     Lack of Transportation (Non-Medical): No   Physical Activity: Insufficiently Active (11/6/2021)    Received from [employer]    Exercise Vital Sign     Days of Exercise per Week: 1 day     Minutes of Exercise per Session: 30 min   Stress: Stress Concern Present (11/6/2021)    Received from [employer]    [employer] - Occupational Stress Questionnaire     Feeling of Stress : To some extent   Social Connections: Moderately Integrated (11/6/2021)    Received from [employer]    Social Connection and Isolation Panel [NHANES]     Frequency of Communication with Friends and Family: Three times a  week     Frequency of Social Gatherings with Friends and Family: Once a week     Attends Zoroastrian Services: More than 4 times per year     Active Member of Clubs or Organizations: Yes     Attends Club or Organization Meetings: More than 4 times per year     Marital Status: Never    Intimate Partner Violence: Not At Risk (11/6/2021)    Received from [employer] [place] [facility] [employer]    Humiliation, Afraid, Rape, and Kick questionnaire     Fear of Current or Ex-Partner: No     Emotionally Abused: No     Physically Abused: No     Sexually Abused: No   Housing Stability: Low Risk  (11/6/2021)    Received from [employer] [place] [facility] [employer]    Housing Stability Vital Sign     Unable to Pay for Housing in the Last Year: No     Number of Places Lived in the Last Year: 1     Unstable Housing in the Last Year: No       Past Surgical History:   Procedure Laterality Date    CARDIAC SURGERY  1978    correction total anomalous pulmonary venous connection    HERNIA REPAIR      HYSTEROSCOPY W/ POLYPECTOMY  2022    KNEE LIGAMENT RECONSTRUCTION Left 2016    MYOMECTOMY  05/12/2023    laparoscopic    UMBILICAL HERNIA REPAIR      WISDOM TOOTH EXTRACTION         ATTESTATION    I saw and evaluated the patient. I personally obtained the key and critical portions of the history and physical exam or was physically present for key and critical portions performed by the resident/fellow. I reviewed the resident/fellow's documentation and discussed the patient with the resident/fellow. I agree with the resident/fellow's medical decision making as documented in the note.        Electronically signed  ROULA Titus MD  [phone]

## 2024-09-03 PROBLEM — M65.4 DE QUERVAIN'S TENOSYNOVITIS, LEFT: Status: ACTIVE | Noted: 2024-09-03

## 2024-11-07 ENCOUNTER — APPOINTMENT (OUTPATIENT)
Dept: PRIMARY CARE | Facility: CLINIC | Age: 44
End: 2024-11-07
Payer: COMMERCIAL

## 2024-11-07 VITALS
SYSTOLIC BLOOD PRESSURE: 146 MMHG | BODY MASS INDEX: 29.99 KG/M2 | OXYGEN SATURATION: 98 % | DIASTOLIC BLOOD PRESSURE: 88 MMHG | WEIGHT: 143.5 LBS | HEART RATE: 72 BPM | TEMPERATURE: 97.7 F

## 2024-11-07 DIAGNOSIS — Q26.2 TAPVR (TOTAL ANOMALOUS PULMONARY VENOUS RETURN) (HHS-HCC): ICD-10-CM

## 2024-11-07 DIAGNOSIS — E78.2 MIXED HYPERLIPIDEMIA: Primary | ICD-10-CM

## 2024-11-07 DIAGNOSIS — I10 HYPERTENSION, UNSPECIFIED TYPE: ICD-10-CM

## 2024-11-07 DIAGNOSIS — G62.9 NEUROPATHY: ICD-10-CM

## 2024-11-07 DIAGNOSIS — Z12.31 SCREENING MAMMOGRAM FOR BREAST CANCER: ICD-10-CM

## 2024-11-07 DIAGNOSIS — Z23 NEEDS FLU SHOT: ICD-10-CM

## 2024-11-07 PROBLEM — J45.909 ASTHMA: Status: RESOLVED | Noted: 2023-09-29 | Resolved: 2024-11-07

## 2024-11-07 PROBLEM — D68.318: Status: RESOLVED | Noted: 2023-09-29 | Resolved: 2024-11-07

## 2024-11-07 LAB
ANION GAP SERPL CALC-SCNC: 11 MMOL/L (ref 10–20)
BUN SERPL-MCNC: 10 MG/DL (ref 7–18)
CALCIUM SERPL-MCNC: 8.4 MG/DL (ref 8.5–10.1)
CHLORIDE SERPL-SCNC: 100 MMOL/L (ref 98–107)
CHOLEST SERPL-MCNC: 201 MG/DL (ref 0–199)
CHOLESTEROL/HDL RATIO: 3.2 (ref 4.2–7)
CO2 SERPL-SCNC: 29 MMOL/L (ref 21–32)
CREAT SERPL-MCNC: 0.9 MG/DL (ref 0.6–1.1)
EGFRCR SERPLBLD CKD-EPI 2021: 80 ML/MIN/1.73M*2
GLUCOSE SERPL-MCNC: 86 MG/DL (ref 74–100)
HDLC SERPL-MCNC: 62 MG/DL (ref 40–59)
IS PATIENT FASTING: ABNORMAL
LDLC SERPL DIRECT ASSAY-MCNC: 119 MG/DL (ref 0–100)
POTASSIUM SERPL-SCNC: 4.1 MMOL/L (ref 3.5–5.1)
SODIUM SERPL-SCNC: 136 MMOL/L (ref 136–145)
TRIGL SERPL-MCNC: 73 MG/DL

## 2024-11-07 PROCEDURE — 3079F DIAST BP 80-89 MM HG: CPT | Performed by: INTERNAL MEDICINE

## 2024-11-07 PROCEDURE — 90471 IMMUNIZATION ADMIN: CPT | Performed by: INTERNAL MEDICINE

## 2024-11-07 PROCEDURE — 1036F TOBACCO NON-USER: CPT | Performed by: INTERNAL MEDICINE

## 2024-11-07 PROCEDURE — 80048 BASIC METABOLIC PNL TOTAL CA: CPT | Performed by: INTERNAL MEDICINE

## 2024-11-07 PROCEDURE — 3077F SYST BP >= 140 MM HG: CPT | Performed by: INTERNAL MEDICINE

## 2024-11-07 PROCEDURE — 99214 OFFICE O/P EST MOD 30 MIN: CPT | Performed by: INTERNAL MEDICINE

## 2024-11-07 PROCEDURE — 90656 IIV3 VACC NO PRSV 0.5 ML IM: CPT | Performed by: INTERNAL MEDICINE

## 2024-11-07 PROCEDURE — 80061 LIPID PANEL: CPT | Performed by: INTERNAL MEDICINE

## 2024-11-07 RX ORDER — GABAPENTIN 300 MG/1
300 CAPSULE ORAL NIGHTLY
Qty: 60 CAPSULE | Refills: 5 | Status: SHIPPED | OUTPATIENT
Start: 2024-11-07 | End: 2025-11-07

## 2024-11-07 ASSESSMENT — ENCOUNTER SYMPTOMS
OCCASIONAL FEELINGS OF UNSTEADINESS: 0
LOSS OF SENSATION IN FEET: 0
DEPRESSION: 0

## 2024-11-07 NOTE — PROGRESS NOTES
Subjective   Patient ID: Nisa Kenny is a 44 y.o. female who presents for Follow-up and Premenstrual Syndrome.    HPI   44 years old female comes in to see me today complaining of severe pain in both lower extremities.  She feels like needles in her feet or like walking on needles specifically at bedtime.  She got this at least a week prior to her periods every month almost.  Looks like or feels like neuropathy.  I will try gabapentin 300 mg at bedtime.  A prescription will be provided.  Also due for mammograms.  Advised her to follow-up with her OB/GYN.  Rest are under control because she is wearing splints during working hours.  Review of Systems  12 system review 12 system negative.  Neuropathy in both feet and below the knee.  Objective   /88 (BP Location: Left arm, Patient Position: Sitting, BP Cuff Size: Adult)   Pulse 72   Temp 36.5 °C (97.7 °F) (Temporal)   Wt 65.1 kg (143 lb 8 oz)   SpO2 98%   BMI 29.99 kg/m²     Physical Exam  Alert oriented no distress nonicteric sclera no jaundice.  Face symmetrical cranial nerves intact.  Neck supple no masses lymph nodes or thyromegaly no jugular venous distention or carotid bruits.  Lungs clear no rales wheezing or crackles.  Heart normal S1 and S2 regular rhythm.  Abdomen benign nontender no masses no organomegaly or pain on palpations.  Neurological exam no deficit normal range of motion and equal strength in upper and lower extremities.  Neuropathic pain at bedtime  Try gabapentin 300 mg at bedtime.  Mammogram requisition provided.  Assessment/Plan   Diagnoses and all orders for this visit:  Mixed hyperlipidemia  -     Lipid Panel  Hypertension, unspecified type  -     Basic Metabolic Panel  Screening mammogram for breast cancer  -     BI mammo bilateral screening tomosynthesis; Future  Needs flu shot  -     Flu vaccine, trivalent, preservative free, age 6 months and greater (Fluarix/Fluzone/Flulaval)  Neuropathy  -     gabapentin (Neurontin) 300 mg  capsule; Take 1 capsule (300 mg) by mouth once daily at bedtime.  TAPVR (total anomalous pulmonary venous return) ([facility])

## 2024-11-08 ENCOUNTER — TELEPHONE (OUTPATIENT)
Dept: PRIMARY CARE | Facility: CLINIC | Age: 44
End: 2024-11-08
Payer: COMMERCIAL

## 2024-11-08 NOTE — TELEPHONE ENCOUNTER
----- Message from Mayo Carrasco sent at 11/8/2024  8:32 AM EST -----  Regarding: r  Lab results from your last visit revealed normal kidney function blood sugar and electrolytes.  Your lipids are slightly elevated with total cholesterol 201 would like it to be below 200 and your LDL which is considered to be bad cholesterol 1 can cause disease yours is 119 would like it to be below 100 or 70 the lowest possible.  So you need to change her diet low-fat diet low carbohydrate diet exercise lose weight and consider statin if you like which will help you bring down your cholesterol if so let me know I can order it for you.

## 2024-11-14 ENCOUNTER — APPOINTMENT (OUTPATIENT)
Dept: OPHTHALMOLOGY | Facility: CLINIC | Age: 44
End: 2024-11-14
Payer: COMMERCIAL

## 2024-11-21 ENCOUNTER — APPOINTMENT (OUTPATIENT)
Dept: OPHTHALMOLOGY | Facility: CLINIC | Age: 44
End: 2024-11-21
Payer: COMMERCIAL

## 2024-11-21 DIAGNOSIS — H52.13 MYOPIA WITH PRESBYOPIA OF BOTH EYES: ICD-10-CM

## 2024-11-21 DIAGNOSIS — H35.022 COATS' DISEASE OF LEFT EYE: Primary | ICD-10-CM

## 2024-11-21 DIAGNOSIS — H52.4 MYOPIA WITH PRESBYOPIA OF BOTH EYES: ICD-10-CM

## 2024-11-21 DIAGNOSIS — H02.88A MEIBOMIAN GLAND DYSFUNCTION (MGD) OF UPPER AND LOWER LIDS OF BOTH EYES: ICD-10-CM

## 2024-11-21 DIAGNOSIS — H02.88B MEIBOMIAN GLAND DYSFUNCTION (MGD) OF UPPER AND LOWER LIDS OF BOTH EYES: ICD-10-CM

## 2024-11-21 DIAGNOSIS — H35.372 EPIRETINAL MEMBRANE (ERM) OF LEFT EYE: ICD-10-CM

## 2024-11-21 ASSESSMENT — REFRACTION_MANIFEST
OD_CYLINDER: -0.75
OS_AXIS: 090
OD_SPHERE: -1.25
OD_CYLINDER: -0.50
OD_SPHERE: -1.25
OS_SPHERE: -2.00
OS_SPHERE: -2.00
OD_ADD: +1.25
OS_CYLINDER: -0.50
OD_AXIS: 110
OS_AXIS: 081
METHOD_AUTOREFRACTION: 1
OS_ADD: +1.25
OS_CYLINDER: -0.50
OD_AXIS: 105

## 2024-11-21 ASSESSMENT — VISUAL ACUITY
OD_SC: 20/25
OS_CC+: -3
CORRECTION_TYPE: CONTACTS
OS_PH_SC: 20/20
OD_PH_SC: 20/20
OD_SC+: -3
METHOD: SNELLEN - LINEAR
OS_CC: 20/20
OD_PH_SC+: -1
OS_PH_SC+: -2
OD_CC: 20/20
OS_SC: 20/40

## 2024-11-21 ASSESSMENT — REFRACTION_CURRENTRX
OD_BRAND: BIOTRUE 1 DAY
OD_DIAMETER: 14.2
OD_BASECURVE: 8.6
OD_CYLINDER: SPHERE
OS_DIAMETER: 14.2
OD_SPHERE: -2.00
OS_BRAND: BIOTRUE 1 DAY
OS_CYLINDER: SPHERE
OS_SPHERE: -2.00
OS_BASECURVE: 8.6

## 2024-11-21 ASSESSMENT — CONF VISUAL FIELD
OD_NORMAL: 1
OD_SUPERIOR_NASAL_RESTRICTION: 0
OS_INFERIOR_TEMPORAL_RESTRICTION: 0
OS_INFERIOR_NASAL_RESTRICTION: 0
OD_INFERIOR_NASAL_RESTRICTION: 0
OD_SUPERIOR_TEMPORAL_RESTRICTION: 0
OS_NORMAL: 1
OS_SUPERIOR_TEMPORAL_RESTRICTION: 0
OD_INFERIOR_TEMPORAL_RESTRICTION: 0
OS_SUPERIOR_NASAL_RESTRICTION: 0

## 2024-11-21 ASSESSMENT — REFRACTION_WEARINGRX
OS_AXIS: 080
OS_ADD: +1.25
OD_AXIS: 105
OD_CYLINDER: -0.50
OS_CYLINDER: -0.50
OS_SPHERE: -2.00
OD_ADD: +1.25
OD_SPHERE: -1.50

## 2024-11-21 ASSESSMENT — ENCOUNTER SYMPTOMS
ALLERGIC/IMMUNOLOGIC NEGATIVE: 0
CONSTITUTIONAL NEGATIVE: 0
HEMATOLOGIC/LYMPHATIC NEGATIVE: 0
EYES NEGATIVE: 1
NEUROLOGICAL NEGATIVE: 0
RESPIRATORY NEGATIVE: 0
CARDIOVASCULAR NEGATIVE: 0
GASTROINTESTINAL NEGATIVE: 0
PSYCHIATRIC NEGATIVE: 0
MUSCULOSKELETAL NEGATIVE: 0
ENDOCRINE NEGATIVE: 0

## 2024-11-21 ASSESSMENT — SLIT LAMP EXAM - LIDS
COMMENTS: NORMAL, MILD MGD UL/LL
COMMENTS: NORMAL, MILD MGD UL/LL

## 2024-11-21 ASSESSMENT — CUP TO DISC RATIO
OS_RATIO: .30
OD_RATIO: .30

## 2024-11-21 ASSESSMENT — TONOMETRY
IOP_METHOD: GOLDMANN APPLANATION
OD_IOP_MMHG: 18
OS_IOP_MMHG: 15

## 2024-11-21 ASSESSMENT — EXTERNAL EXAM - LEFT EYE: OS_EXAM: NORMAL

## 2024-11-21 ASSESSMENT — EXTERNAL EXAM - RIGHT EYE: OD_EXAM: NORMAL

## 2024-11-21 NOTE — PROGRESS NOTES
Assessment/Plan   Diagnoses and all orders for this visit:  Coats' disease of left eye  -patient previously being followed by retina for a variant of COATS disease and neovascularization elsewhere (NVE) in the periphery treated with panretinal photocoagulation (PRP)  -on exam today no new signs of exudates or neovascularization elsewhere (NVE)  -optos photos taken to document 2023  -dicussed findings with patient-monitor at this time  Epiretinal membrane (ERM) of left eye  -stable on DFE. Monitor with OCT MAC  Myopia with presbyopia of both eyes  -New spec rx released today per patient request. Ocular health wnl for age OU. Monitor 1 year or sooner prn. Refraction billed today.  -Discussed proper wear, care, replacement of contact lenses. Gave handout. D/c cl wear and RTC if eyes become red, painful, irritated. Monitor 1 year.   CL eval billed today. $35 Finalized Biotrue contact lens (CL) with good comfort/fit/visual acuity (VA). Discussed that patient will have to potentially wear OTC NVO specs with contact lens (CL) on the computer.   Meibomian gland disease, unspecified laterality  -discussed using warm compresses daily and Refresh AT's  -patient reports having a hordeolum recently  -has stopped using eyeliner as often     RTC 1 year for annual with MRX, contact lens (CL) exam, DFE, OCT MAC

## 2024-11-21 NOTE — Clinical Note
Both eyes (OU) Contact Lens Order  Quantity: 2 Package: 90 PK Appointment needed? No Medically necessary? No Ship To: Home Additional instructions: Fadi Quintana. Patient is ordering 1 box for each eye. Thanks!

## 2024-11-23 PROBLEM — H02.88A MEIBOMIAN GLAND DYSFUNCTION (MGD) OF UPPER AND LOWER LIDS OF BOTH EYES: Status: ACTIVE | Noted: 2024-11-23

## 2024-11-23 PROBLEM — H02.88B MEIBOMIAN GLAND DYSFUNCTION (MGD) OF UPPER AND LOWER LIDS OF BOTH EYES: Status: ACTIVE | Noted: 2024-11-23

## 2024-11-29 ENCOUNTER — HOSPITAL ENCOUNTER (OUTPATIENT)
Dept: RADIOLOGY | Facility: CLINIC | Age: 44
Discharge: HOME | End: 2024-11-29
Payer: COMMERCIAL

## 2024-11-29 DIAGNOSIS — Z12.31 SCREENING MAMMOGRAM FOR BREAST CANCER: ICD-10-CM

## 2024-11-29 PROCEDURE — 77063 BREAST TOMOSYNTHESIS BI: CPT

## 2024-12-04 ENCOUNTER — TELEPHONE (OUTPATIENT)
Dept: PRIMARY CARE | Facility: CLINIC | Age: 44
End: 2024-12-04
Payer: COMMERCIAL

## 2024-12-04 ENCOUNTER — OFFICE VISIT (OUTPATIENT)
Dept: URGENT CARE | Age: 44
End: 2024-12-04
Payer: COMMERCIAL

## 2024-12-04 VITALS
OXYGEN SATURATION: 97 % | BODY MASS INDEX: 30.22 KG/M2 | SYSTOLIC BLOOD PRESSURE: 146 MMHG | WEIGHT: 144.6 LBS | HEART RATE: 85 BPM | DIASTOLIC BLOOD PRESSURE: 97 MMHG | TEMPERATURE: 98.5 F

## 2024-12-04 DIAGNOSIS — S22.42XA CLOSED FRACTURE OF MULTIPLE RIBS OF LEFT SIDE, INITIAL ENCOUNTER: ICD-10-CM

## 2024-12-04 DIAGNOSIS — S20.20XA CONTUSION OF THORAX, INITIAL ENCOUNTER: ICD-10-CM

## 2024-12-04 DIAGNOSIS — L24.89 IRRITANT CONTACT DERMATITIS DUE TO OTHER AGENTS: Primary | ICD-10-CM

## 2024-12-04 DIAGNOSIS — S29.019A THORACIC MYOFASCIAL STRAIN, INITIAL ENCOUNTER: ICD-10-CM

## 2024-12-04 RX ORDER — KETOCONAZOLE 20 MG/G
CREAM TOPICAL 2 TIMES DAILY
Qty: 60 G | Refills: 1 | Status: SHIPPED | OUTPATIENT
Start: 2024-12-04

## 2024-12-04 RX ORDER — IBUPROFEN 800 MG/1
TABLET ORAL
Qty: 30 TABLET | Refills: 0 | Status: SHIPPED | OUTPATIENT
Start: 2024-12-04

## 2024-12-04 RX ORDER — CYCLOBENZAPRINE HCL 10 MG
10 TABLET ORAL NIGHTLY PRN
Qty: 10 TABLET | Refills: 0 | Status: SHIPPED | OUTPATIENT
Start: 2024-12-04

## 2024-12-04 ASSESSMENT — ENCOUNTER SYMPTOMS
BACK PAIN: 1
CONSTITUTIONAL NEGATIVE: 1

## 2024-12-04 NOTE — PROGRESS NOTES
Subjective   Patient ID: Nisa Kenny is a 44 y.o. female. They present today with a chief complaint of Back Pain (X1week ) and Rash (Bilateral breast x2weeks).    History of Present Illness    Back Pain    Rash        Past Medical History  Allergies as of 12/04/2024 - Reviewed 12/04/2024   Allergen Reaction Noted    Peanut Swelling 08/13/2019    Shellfish containing products Anaphylaxis 04/28/2023    Shellfish derived Anaphylaxis 08/13/2019    Hay fever and allergy relief Runny nose 04/28/2023    Nut flavor Hives 04/28/2023    Seasonale (91) [levonorgestrel-ethinyl estrad] Runny nose 04/28/2023    Tree nuts Hives and Angioedema 03/18/2024    Latex Rash 09/29/2023       (Not in a hospital admission)       Past Medical History:   Diagnosis Date    Asthma     Atopic eczema     Personal history of transient ischemic attack (TIA), and cerebral infarction without residual deficits     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 She has never used smokeless tobacco. She reports current alcohol use of about 2.0 standard drinks of alcohol per week. She reports that she does not use drugs.    Review of Systems  Review of Systems   Constitutional: Negative.    Musculoskeletal:  Positive for back pain.   Skin:  Positive for rash.                                  Objective    Vitals:    12/04/24 1043   BP: (!) 146/97   Pulse: 85   Temp: 36.9 °C (98.5 °F)   SpO2: 97%   Weight: 65.6 kg (144 lb 9.6 oz)     No LMP recorded.    Physical  Exam  Constitutional:       Appearance: Normal appearance.   Pulmonary:      Effort: Pulmonary effort is normal.      Breath sounds: Normal breath sounds.   Musculoskeletal:      Cervical back: Normal.      Thoracic back: Spasms and tenderness present.      Comments: Paravertebral muscle are spastic   Skin:     Comments: Erythematous small patches of bilateral inframamary areas   Neurological:      Mental Status: She is alert.         Procedures    Point of Care Test & Imaging Results from this visit  No results found for this visit on 12/04/24.   No results found.    Diagnostic study results (if any) were reviewed by Christie Cordova MD.    Assessment/Plan   Allergies, medications, history, and pertinent labs/EKGs/Imaging reviewed by Christie Cordova MD.     Medical Decision Making      Orders and Diagnoses  There are no diagnoses linked to this encounter.    Medical Admin Record      Patient disposition: Home    Electronically signed by Christie Cordvoa MD  11:50 AM

## 2024-12-04 NOTE — TELEPHONE ENCOUNTER
----- Message from Mayo Carrasco sent at 12/3/2024  5:59 PM EST -----  Regarding: mammo results  Mammogram results are normal no suspicious findings.  Repeat in 1 year

## 2024-12-13 ENCOUNTER — TELEMEDICINE (OUTPATIENT)
Dept: PRIMARY CARE | Facility: CLINIC | Age: 44
End: 2024-12-13
Payer: COMMERCIAL

## 2024-12-13 ENCOUNTER — APPOINTMENT (OUTPATIENT)
Dept: PRIMARY CARE | Facility: CLINIC | Age: 44
End: 2024-12-13
Payer: COMMERCIAL

## 2024-12-13 DIAGNOSIS — J01.00 ACUTE NON-RECURRENT MAXILLARY SINUSITIS: Primary | ICD-10-CM

## 2024-12-13 DIAGNOSIS — Z12.11 SCREENING FOR MALIGNANT NEOPLASM OF COLON: Primary | ICD-10-CM

## 2024-12-13 PROCEDURE — 99213 OFFICE O/P EST LOW 20 MIN: CPT | Performed by: INTERNAL MEDICINE

## 2024-12-13 RX ORDER — AZITHROMYCIN 250 MG/1
TABLET, FILM COATED ORAL
Qty: 6 TABLET | Refills: 0 | Status: SHIPPED | OUTPATIENT
Start: 2024-12-13 | End: 2024-12-18

## 2024-12-13 RX ORDER — OLANZAPINE 10 MG/1
10 TABLET ORAL ONCE
Status: CANCELLED | OUTPATIENT
Start: 2024-12-13 | End: 2024-12-13

## 2024-12-13 RX ORDER — QUETIAPINE FUMARATE 50 MG/1
50 TABLET, FILM COATED ORAL NIGHTLY
Qty: 30 TABLET | Refills: 0 | Status: CANCELLED | OUTPATIENT
Start: 2024-12-13 | End: 2025-01-12

## 2024-12-13 NOTE — PROGRESS NOTES
Subjective   Patient ID: Nisa Greene is a 44 y.o. female who presents for No chief complaint on file..    HPI   I had a telephone visit and virtual visit with Mrs. Keyana greene, she agreed to talk to me on the virtual visit.  She complained of feeling sore throat for a day since yesterday, she had a cough and a headache with nasal drainage.  No phlegm production no fever no chills.  But she knew something going on because of her cough and headache it sounded like a bad sinus infection starting or possibly bronchitis , bronchial sinusitis.  Agreed to treat her with an antibiotic to take it for 5 days azithromycin.  Her pharmacy is [facility] at [facility].  No known allergies.  Advised patient to increase her fluid intake like water or soup tea hydrating herself enough.  Take vitamin C vitamin D magnesium oxide and rest.  Review of Systems  Coughing with a headache and nasal discharge for a day with sore throat  Objective   There were no vitals taken for this visit.    Physical Exam  No physical examination was done.  Assessment/Plan

## 2024-12-19 ENCOUNTER — APPOINTMENT (OUTPATIENT)
Dept: PRIMARY CARE | Facility: CLINIC | Age: 44
End: 2024-12-19
Payer: COMMERCIAL

## 2024-12-19 VITALS
OXYGEN SATURATION: 98 % | WEIGHT: 145 LBS | HEART RATE: 76 BPM | BODY MASS INDEX: 30.31 KG/M2 | DIASTOLIC BLOOD PRESSURE: 103 MMHG | SYSTOLIC BLOOD PRESSURE: 162 MMHG | TEMPERATURE: 98.1 F

## 2024-12-19 DIAGNOSIS — J32.9 OTHER SINUSITIS, UNSPECIFIED CHRONICITY: ICD-10-CM

## 2024-12-19 DIAGNOSIS — M77.8 CAPSULITIS OF SHOULDER, RIGHT: Primary | ICD-10-CM

## 2024-12-19 DIAGNOSIS — I10 HYPERTENSION, UNSPECIFIED TYPE: ICD-10-CM

## 2024-12-19 PROCEDURE — 3077F SYST BP >= 140 MM HG: CPT | Performed by: INTERNAL MEDICINE

## 2024-12-19 PROCEDURE — 3080F DIAST BP >= 90 MM HG: CPT | Performed by: INTERNAL MEDICINE

## 2024-12-19 PROCEDURE — 1036F TOBACCO NON-USER: CPT | Performed by: INTERNAL MEDICINE

## 2024-12-19 PROCEDURE — 99213 OFFICE O/P EST LOW 20 MIN: CPT | Performed by: INTERNAL MEDICINE

## 2024-12-19 ASSESSMENT — PATIENT HEALTH QUESTIONNAIRE - PHQ9
SUM OF ALL RESPONSES TO PHQ9 QUESTIONS 1 AND 2: 0
1. LITTLE INTEREST OR PLEASURE IN DOING THINGS: NOT AT ALL
2. FEELING DOWN, DEPRESSED OR HOPELESS: NOT AT ALL

## 2024-12-19 ASSESSMENT — ENCOUNTER SYMPTOMS
OCCASIONAL FEELINGS OF UNSTEADINESS: 0
LOSS OF SENSATION IN FEET: 0
DEPRESSION: 0

## 2024-12-19 ASSESSMENT — PAIN SCALES - GENERAL: PAINLEVEL_OUTOF10: 6

## 2024-12-19 NOTE — PROGRESS NOTES
Subjective   Patient ID: Nisa Kenny is a 44 y.o. female who presents for Back Pain (Urgent care 12/04/024).    HPI   44 years old female comes in to see me today complaining of right scapular pain.  Tender on pressing on the scapula on the right side.  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  Her pain mostly in the mid back very tight.  She was seen at the urgent care center December 8 and was provided with a muscle relaxant.  She is not taking it because it may interfere with the antibiotic was prescribed for sinus infection.  Informed and advised her to use heating pad for half hour every 2 hours only when awake.  To massage the area with the rubbing topical gel like a prescription and Bengay or Voltaren gel Motrin gel etc..  Take the muscle relaxant Flexeril as prescribed make it twice a day 10 mg and also takes ibuprofen as needed up to 3 times a day with food.  You need to massage that area.  Review of Systems  12 system review 12 system negative.  Objective   BP (!) 162/103 (BP Location: Right arm, Patient Position: Sitting, BP Cuff Size: Adult)   Pulse 76   Temp 36.7 °C (98.1 °F) (Temporal)   Wt 65.8 kg (145 lb)   SpO2 98%   BMI 30.31 kg/m²     Physical Exam  Alert oriented in no distress.  Blood pressure elevated.  Will call her back to remind her to check her blood pressure.  Nonicteric sclera or jaundice.  Face symmetrical cranial nerves intact.  Neck supple no masses lymph no thyromegaly or jugular venous distention.  Lungs clear no rales wheezing or crackles.  Heart normal S1-S2 regular rhythm.  Abdomen benign neurologically intact no skin rashes.  Assessment/Plan   Diagnoses and all orders for this visit:  Capsulitis of shoulder, right  Hypertension, unspecified type  Other sinusitis, unspecified chronicity

## 2025-01-02 ENCOUNTER — TELEMEDICINE (OUTPATIENT)
Dept: PRIMARY CARE | Facility: CLINIC | Age: 45
End: 2025-01-02
Payer: COMMERCIAL

## 2025-01-02 DIAGNOSIS — J01.01 ACUTE RECURRENT MAXILLARY SINUSITIS: Primary | ICD-10-CM

## 2025-01-02 RX ORDER — AMOXICILLIN AND CLAVULANATE POTASSIUM 500; 125 MG/1; MG/1
500 TABLET, FILM COATED ORAL 3 TIMES DAILY
Qty: 30 TABLET | Refills: 0 | Status: SHIPPED | OUTPATIENT
Start: 2025-01-02 | End: 2025-01-12

## 2025-01-02 ASSESSMENT — ENCOUNTER SYMPTOMS
PND: 1
SPUTUM PRODUCTION: 1
WHEEZING: 1
SHORTNESS OF BREATH: 1
ORTHOPNEA: 1

## 2025-01-02 NOTE — PROGRESS NOTES
Subjective   Patient ID: Nisa Kenny is a 44 y.o. female who presents for No chief complaint on file..    HPI   I had a prolonged conversation with Keyana Kenny who is complaining of an infection again in her sinuses and bronchitis because of her asthma giving her trouble at night.  She is having difficulty breathing at times even though she uses Singulair Advair and albuterol inhalers.  We agreed to give her Augmentin to take 3 times a day with food instead of azithromycin and to take a cough medication as needed.  Call me within 7 days and update me on your medical condition.  Also call your pulmonologist and find out if we need to do anything specific about your asthma or different.  Review of Systems  Coughing trouble breathing at night asthma in the past and again today  Objective   There were no vitals taken for this visit.    Physical Exam    Assessment/Plan   Diagnoses and all orders for this visit:  Acute recurrent maxillary sinusitis  -     amoxicillin-pot clavulanate (Augmentin) 500-125 mg tablet; Take 1 tablet (500 mg) by mouth 3 times a day for 10 days.

## 2025-02-10 ENCOUNTER — HOSPITAL ENCOUNTER (OUTPATIENT)
Dept: RADIOLOGY | Facility: CLINIC | Age: 45
Discharge: HOME | End: 2025-02-10
Payer: COMMERCIAL

## 2025-02-10 ENCOUNTER — OFFICE VISIT (OUTPATIENT)
Dept: PULMONOLOGY | Facility: CLINIC | Age: 45
End: 2025-02-10
Payer: COMMERCIAL

## 2025-02-10 VITALS
WEIGHT: 139.6 LBS | SYSTOLIC BLOOD PRESSURE: 157 MMHG | OXYGEN SATURATION: 98 % | BODY MASS INDEX: 29.18 KG/M2 | DIASTOLIC BLOOD PRESSURE: 91 MMHG | HEART RATE: 89 BPM | TEMPERATURE: 97.7 F | RESPIRATION RATE: 16 BRPM

## 2025-02-10 DIAGNOSIS — J45.909 ASTHMA, UNSPECIFIED ASTHMA SEVERITY, UNSPECIFIED WHETHER COMPLICATED, UNSPECIFIED WHETHER PERSISTENT (HHS-HCC): Primary | ICD-10-CM

## 2025-02-10 DIAGNOSIS — J45.909 ASTHMA, UNSPECIFIED ASTHMA SEVERITY, UNSPECIFIED WHETHER COMPLICATED, UNSPECIFIED WHETHER PERSISTENT (HHS-HCC): ICD-10-CM

## 2025-02-10 PROCEDURE — 99214 OFFICE O/P EST MOD 30 MIN: CPT | Mod: 25 | Performed by: INTERNAL MEDICINE

## 2025-02-10 PROCEDURE — 1036F TOBACCO NON-USER: CPT | Performed by: INTERNAL MEDICINE

## 2025-02-10 PROCEDURE — 99214 OFFICE O/P EST MOD 30 MIN: CPT | Performed by: INTERNAL MEDICINE

## 2025-02-10 PROCEDURE — 71046 X-RAY EXAM CHEST 2 VIEWS: CPT | Performed by: RADIOLOGY

## 2025-02-10 PROCEDURE — 71046 X-RAY EXAM CHEST 2 VIEWS: CPT

## 2025-02-10 RX ORDER — ALBUTEROL SULFATE 90 UG/1
1 INHALANT RESPIRATORY (INHALATION) ONCE
OUTPATIENT
Start: 2025-02-10

## 2025-02-10 RX ORDER — IPRATROPIUM BROMIDE AND ALBUTEROL SULFATE 2.5; .5 MG/3ML; MG/3ML
3 SOLUTION RESPIRATORY (INHALATION) 4 TIMES DAILY PRN
Qty: 180 ML | Refills: 3 | Status: SHIPPED | OUTPATIENT
Start: 2025-02-10

## 2025-02-10 RX ORDER — PREDNISONE 10 MG/1
TABLET ORAL
Qty: 30 TABLET | Refills: 0 | Status: SHIPPED | OUTPATIENT
Start: 2025-02-10 | End: 2025-03-12

## 2025-02-10 RX ORDER — AZITHROMYCIN 250 MG/1
TABLET, FILM COATED ORAL
Qty: 6 TABLET | Refills: 0 | Status: SHIPPED | OUTPATIENT
Start: 2025-02-10 | End: 2025-02-15

## 2025-02-10 RX ORDER — ALBUTEROL SULFATE 0.83 MG/ML
3 SOLUTION RESPIRATORY (INHALATION) ONCE
OUTPATIENT
Start: 2025-02-10 | End: 2025-02-10

## 2025-02-10 RX ORDER — ALBUTEROL SULFATE 90 UG/1
2 INHALANT RESPIRATORY (INHALATION) EVERY 6 HOURS PRN
Qty: 18 G | Refills: 11 | Status: SHIPPED | OUTPATIENT
Start: 2025-02-10 | End: 2026-02-10

## 2025-02-10 ASSESSMENT — ENCOUNTER SYMPTOMS
FACIAL SWELLING: 0
APNEA: 0
EYE DISCHARGE: 0
DYSURIA: 0
ABDOMINAL DISTENTION: 0
DIFFICULTY URINATING: 0
STRIDOR: 0
PALPITATIONS: 0
SINUS PRESSURE: 0
SPEECH DIFFICULTY: 0
ARTHRALGIAS: 0
NERVOUS/ANXIOUS: 0
NUMBNESS: 0
CONSTIPATION: 0
FEVER: 0
HEADACHES: 0
SLEEP DISTURBANCE: 0
EYE REDNESS: 0
FREQUENCY: 0
BRUISES/BLEEDS EASILY: 0
UNEXPECTED WEIGHT CHANGE: 0
TREMORS: 0
ABDOMINAL PAIN: 0
CHOKING: 0
ADENOPATHY: 0
WEAKNESS: 0
HEMATURIA: 0
COUGH: 1
SINUS PAIN: 0
RHINORRHEA: 0
SHORTNESS OF BREATH: 1
JOINT SWELLING: 0
AGITATION: 0
DIZZINESS: 0
WHEEZING: 1
NAUSEA: 0
FATIGUE: 0
LIGHT-HEADEDNESS: 0

## 2025-02-10 NOTE — PROGRESS NOTES
@PULMONARY FOLLOW-UP@       PROBLEM: Asthma    ASSESSMENT:  The patient is a 44-year-old who is status post surgical correction of a total anomalous pulmonary venous return.  She was seen several years ago with shortness of breath and she was determined to have severe airflow obstruction with FEV1 down to 0.87 L or 41% predicted and FEV1 percent of 40%.  She had a significant bronchial response of 27% in her FEV1.  At that point her eosinophil count was blank and her Abhijit level was 26.  Her DLCO was normal.  She has been treated with montelukast and Advair has increasing symptoms over the last several months.  Her ACT asthma score is around 7.  She has decreased breath sounds on examination which are decreased.  She has no wheezes or rhonchi.  She clearly has severe for obstruction and probably needs some prednisone at the present time.  She is coughing up some slight purulent phlegm.  Her chest x-ray is clear.    PLAN:  I am going obtain complete PFTs along with a fractional exhalation of nitric oxide and CBC with differential    In addition I am going to place on a course of prednisone and azithromycin.    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 She also has a cochlear implant and a history of asthma. The echocardiogram from December 2022 revealed normal-sized left and right ventricles with mild TR and an RVSP of 31 mmHg. She has mitral stenosis with a mean gradient of 2 mmHg and trace MR. She is being treated for hypertension. She has a desire for future pregnancies. She apparently has carried the diagnosis of asthma since childhood and reportedly uses her rescue inhaler up to 4 times per day. She does have dyspnea on exertion. She has had to be a never smoker. For her asthma she is on ipratropium albuterol  nebulization as needed albuterol as needed and montelukast. Spirometry from 2015 outlined an FVC of 1.3 L 55% predicted with an FEV1 of 0.88 L 40% at the nephew present at 64%.    When seen on 2023 the patient reports that she has had asthma dating back to childhood. She notes various triggers for wheezing include odors and irritants. She also has claustrophobia and when she is in an enclosed area can get more short of breath. Over the last year or so she is complaining of increasing dyspnea on exertion. She has 3 3 flights of steps at work and has noted that it is more difficult to climb them. She does have seasonal allergies. She is using albuterol 4+ times per day and also has a nebulizer she uses intermittently. She is utilizing the albuterol because of dyspnea. She is not having coughing congestion or wheezing. She is a never smoker. She sleeps on 2 pillows at night because otherwise she is short of breath. She has done that forever. She has no swelling of her ankles. She has some eczema and has no sinus problems or aspirin allergy. Her grandmother  of a severe asthma attack and she is worried about that. She recently had a URI symptom complex with negative COVID testing x3.     Pulmonary function test from March 10, 2023 outlined an FVC of 1.81 L 71% predicted with an FEV1 of 0.87 L 41% predicted and FEV1 percent of 40%.  After bronchodilators the forced vital capacity was 2.11 L 83% predicted with 17% increase in the FEV1 was 1.10 L 52% predicted at 27% increase in the FEV1 percent was 52%.  The TLC was 113% predicted the RV over TLC was 144% addicted the DLCO was 104% predicted the FeNO level was 26    A chest x-ray from 2023 revealed no focal infiltrates.    Currently, the patient reports that over the last several months she has had increasing cough congestion and sputum production.  She has been utilizing her nebulizer with albuterol ipratropium more frequently and  albuterol HFA more frequently.  At times the sputum she brings up is purulent in nature.  With coal weather has been quite bothersome for her.  She feels short of breath with exertion.  Her ACT asthma control score is only 7.  She continues on her Advair 250/50 along with montelukast.  Previously she had slight eosinophilia around 5.5% with a low total IgE level.      Allergies   Allergen Reactions    Peanut Swelling    Shellfish Containing Products Anaphylaxis    Shellfish Derived Anaphylaxis    Hay Fever And Allergy Relief Runny nose    Nut Flavor Hives    Seasonale (91) [Levonorgestrel-Ethinyl Estrad] Runny nose    Tree Nuts Hives and Angioedema    Latex Rash            Current Outpatient Medications:     acetaminophen/pyrilam/pamabrom (MIDOL PMS MAXIMUM STRENGTH ORAL), Take by mouth., Disp: , Rfl:     Advair Diskus 250-50 mcg/dose diskus inhaler, TAKE 1 PUFF TWICE DAILY AND GARGLE AFTER UE, Disp: , Rfl:     albuterol 2.5 mg /3 mL (0.083 %) nebulizer solution, USE 1 UNIT DOSE IN NEBULIZER EVERY 4 TO 6 HOURS AS NEEDED., Disp: 75 mL, Rfl: 11    cholecalciferol (Vitamin D-3) 50 MCG (2000 UT) tablet, Take 1 tablet (2,000 Units) by mouth once daily., Disp: , Rfl:     EPINEPHrine 0.3 mg/0.3 mL injection syringe, inject the contents of 1 pen intramuscularly as directed x 1 dose- call 911 after use, Disp: , Rfl:     ibuprofen 800 mg tablet, Take 1 with food and plenty of water as needed up to 1-2 times a day, Disp: 30 tablet, Rfl: 0    ipratropium-albuteroL (Duo-Neb) 0.5-2.5 mg/3 mL nebulizer solution, INHALE 1 UNIT DOSE Every 6 hours PRN SHort of breath, Disp: , Rfl:     montelukast (Singulair) 10 mg tablet, Take 1 tablet (10 mg) by mouth once daily at bedtime., Disp: 30 tablet, Rfl: 11    PNV no.95/ferrous fum/folic ac (PRENATAL ORAL), Prenatal TABS Refills: 0, Disp: , Rfl:     azithromycin (Zithromax) 250 mg tablet, Take 2 by mouth today then 1 daily for 4 days, Disp: 6 tablet, Rfl: 0    predniSONE (Deltasone) 10 mg  tablet, Take 4 tabs (40mg) daily for 3 days, then 3 tabs (30mg) daily for 3 days,  2 tabs (20mg) daily for 3 days,  1 tab (10 mg) daily for 3 days., Disp: 30 tablet, Rfl: 0          Review of Systems   Constitutional:  Negative for fatigue, fever and unexpected weight change.   HENT:  Negative for congestion, facial swelling, nosebleeds, postnasal drip, rhinorrhea, sinus pressure and sinus pain.    Eyes:  Negative for discharge, redness and visual disturbance.   Respiratory:  Positive for cough, shortness of breath and wheezing. Negative for apnea, choking and stridor.    Cardiovascular:  Negative for chest pain, palpitations and leg swelling.   Gastrointestinal:  Negative for abdominal distention, abdominal pain, constipation and nausea.   Endocrine: Negative for cold intolerance and heat intolerance.   Genitourinary:  Negative for difficulty urinating, dysuria, frequency and hematuria.   Musculoskeletal:  Negative for arthralgias, gait problem and joint swelling.   Allergic/Immunologic: Negative for environmental allergies, food allergies and immunocompromised state.   Neurological:  Negative for dizziness, tremors, syncope, speech difficulty, weakness, light-headedness, numbness and headaches.   Hematological:  Negative for adenopathy. Does not bruise/bleed easily.   Psychiatric/Behavioral:  Negative for agitation, behavioral problems and sleep disturbance. The patient is not nervous/anxious.         Vitals:    02/10/25 0923   BP: (!) 157/91   Pulse: 89   Resp: 16   Temp: 36.5 °C (97.7 °F)   SpO2: 98%        Physical Exam  Vitals reviewed.   Constitutional:       Appearance: Normal appearance.   HENT:      Head: Normocephalic and atraumatic.   Eyes:      Extraocular Movements: Extraocular movements intact.   Cardiovascular:      Rate and Rhythm: Normal rate and regular rhythm.      Heart sounds: No murmur heard.     No friction rub. No gallop.   Pulmonary:      Effort: Pulmonary effort is normal. No respiratory  distress.      Breath sounds: Normal breath sounds. No stridor. No wheezing, rhonchi or rales.      Comments: Decreased breath sounds  Chest:      Chest wall: No tenderness.   Abdominal:      General: Abdomen is flat. There is no distension.      Palpations: Abdomen is soft. There is no mass.      Tenderness: There is no abdominal tenderness.   Musculoskeletal:         General: Normal range of motion.      Cervical back: Normal range of motion.      Right lower leg: No edema.      Left lower leg: No edema.   Skin:     General: Skin is warm and dry.   Neurological:      Mental Status: She is alert and oriented to person, place, and time.   Psychiatric:         Mood and Affect: Mood normal.         Behavior: Behavior normal.

## 2025-02-10 NOTE — PATIENT INSTRUCTIONS
I am going obtain complete PFTs along with a fractional exhalation of nitric oxide and CBC with differential    In addition I am going to place on a course of prednisone and azithromycin.    She will come back in 1 month

## 2025-02-18 ENCOUNTER — ANCILLARY PROCEDURE (OUTPATIENT)
Facility: CLINIC | Age: 45
End: 2025-02-18
Payer: COMMERCIAL

## 2025-02-18 ENCOUNTER — APPOINTMENT (OUTPATIENT)
Dept: RESPIRATORY THERAPY | Facility: CLINIC | Age: 45
End: 2025-02-18
Payer: COMMERCIAL

## 2025-02-18 DIAGNOSIS — J45.909 ASTHMA, UNSPECIFIED ASTHMA SEVERITY, UNSPECIFIED WHETHER COMPLICATED, UNSPECIFIED WHETHER PERSISTENT (HHS-HCC): ICD-10-CM

## 2025-02-18 PROCEDURE — 94060 EVALUATION OF WHEEZING: CPT

## 2025-02-18 PROCEDURE — 94727 GAS DIL/WSHOT DETER LNG VOL: CPT

## 2025-02-18 PROCEDURE — 94727 GAS DIL/WSHOT DETER LNG VOL: CPT | Performed by: INTERNAL MEDICINE

## 2025-02-18 PROCEDURE — 94060 EVALUATION OF WHEEZING: CPT | Performed by: INTERNAL MEDICINE

## 2025-02-18 PROCEDURE — 94729 DIFFUSING CAPACITY: CPT

## 2025-02-26 DIAGNOSIS — J45.909 ASTHMA, UNSPECIFIED ASTHMA SEVERITY, UNSPECIFIED WHETHER COMPLICATED, UNSPECIFIED WHETHER PERSISTENT (HHS-HCC): Primary | ICD-10-CM

## 2025-02-26 RX ORDER — FLUTICASONE PROPIONATE AND SALMETEROL 250; 50 UG/1; UG/1
1 POWDER RESPIRATORY (INHALATION)
Qty: 180 EACH | Refills: 3 | Status: SHIPPED | OUTPATIENT
Start: 2025-02-26

## 2025-02-26 NOTE — TELEPHONE ENCOUNTER
Patient is requesting a refill on her Advair 90 day supply to be sent to [facility] on file.

## 2025-03-14 ENCOUNTER — TELEMEDICINE (OUTPATIENT)
Dept: PRIMARY CARE | Facility: CLINIC | Age: 45
End: 2025-03-14
Payer: COMMERCIAL

## 2025-03-14 DIAGNOSIS — J40 BRONCHITIS: Primary | ICD-10-CM

## 2025-03-14 PROCEDURE — 99212 OFFICE O/P EST SF 10 MIN: CPT | Performed by: INTERNAL MEDICINE

## 2025-03-14 RX ORDER — BENZONATATE 200 MG/1
200 CAPSULE ORAL 3 TIMES DAILY PRN
Qty: 30 CAPSULE | Refills: 0 | Status: SHIPPED | OUTPATIENT
Start: 2025-03-14 | End: 2025-04-13

## 2025-03-14 RX ORDER — AZITHROMYCIN 250 MG/1
TABLET, FILM COATED ORAL
Qty: 6 TABLET | Refills: 0 | Status: SHIPPED | OUTPATIENT
Start: 2025-03-14 | End: 2025-03-19

## 2025-03-14 NOTE — PROGRESS NOTES
Subjective   Patient ID: Nisa Kenny is a 44 y.o. female who presents for No chief complaint on file..    HPI   I had a telephone conversation with Hiram kirk who has a nonproductive cough for a day.  She is coughing but unable to produce anything up.  She thought this may be allergies to took Zyrtec with no help.  She had no sore throat no fever or chills and checked for COVID it was negative.  Her temperature is 97.7.  So most likely pneumonitis or bronchitis and we agreed to give her a Z-Reed and benzonatate.  She has no known allergies to any drugs.  Review of Systems    Objective   There were no vitals taken for this visit.    Physical Exam  No physical examination was done.  Assessment/Plan

## 2025-04-11 ENCOUNTER — OFFICE VISIT (OUTPATIENT)
Dept: PRIMARY CARE | Facility: CLINIC | Age: 45
End: 2025-04-11
Payer: COMMERCIAL

## 2025-04-11 VITALS
WEIGHT: 149 LBS | BODY MASS INDEX: 30.04 KG/M2 | DIASTOLIC BLOOD PRESSURE: 78 MMHG | HEIGHT: 59 IN | TEMPERATURE: 98.1 F | SYSTOLIC BLOOD PRESSURE: 124 MMHG

## 2025-04-11 DIAGNOSIS — M54.6 THORACIC SPINE PAIN: ICD-10-CM

## 2025-04-11 DIAGNOSIS — R07.81 ANTERIOR PLEURITIC PAIN: Primary | ICD-10-CM

## 2025-04-11 DIAGNOSIS — J40 BRONCHITIS, COMPLICATED: ICD-10-CM

## 2025-04-11 PROBLEM — Q26.2: Status: RESOLVED | Noted: 2023-09-29 | Resolved: 2025-04-11

## 2025-04-11 PROCEDURE — 3008F BODY MASS INDEX DOCD: CPT | Performed by: INTERNAL MEDICINE

## 2025-04-11 PROCEDURE — 99214 OFFICE O/P EST MOD 30 MIN: CPT | Performed by: INTERNAL MEDICINE

## 2025-04-11 PROCEDURE — 1036F TOBACCO NON-USER: CPT | Performed by: INTERNAL MEDICINE

## 2025-04-11 ASSESSMENT — ENCOUNTER SYMPTOMS
DEPRESSION: 0
OCCASIONAL FEELINGS OF UNSTEADINESS: 0
LOSS OF SENSATION IN FEET: 0

## 2025-04-11 ASSESSMENT — PAIN SCALES - GENERAL: PAINLEVEL_OUTOF10: 4

## 2025-04-11 NOTE — PROGRESS NOTES
"Subjective   Patient ID: Nisa Kenny is a 44 y.o. female who presents for pain under breast.    HPI   44 years old female comes in to see me today after 2 bouts with cold symptoms and bronchitis.  She has been sick on and off for the whole 4 weeks and now ended up since the weekend with pain under her right breast mostly upon inhalation or taking a deep breath.  The pain could be very sharp and she is wondering what kind of pain it is.  Also complaining of upper back pain.  She gained a few pounds.  Review of Systems  12 system review 12 system negative.  Objective   /78 (BP Location: Right arm, Patient Position: Sitting, BP Cuff Size: Adult)   Temp 36.7 °C (98.1 °F) (Temporal)   Ht 1.499 m (4' 11\")   Wt 67.6 kg (149 lb)   BMI 30.09 kg/m²     Physical Exam  Normal blood pressure.  Alert oriented no distress except when she takes a deep breath.  Nonicteric sclera or jaundice.  Face symmetrical cranial nerves intact.  Neck supple no masses lymph no thyromegaly jugular venous distention or carotid bruits.  Lungs clear no rales wheezing or crackles.  Heart normal S1 and S2 regular rhythm.  Pain upon palpation of under right breast on the rib cage.  She is sore and painful on deep breathing.  No pain upon exhaling.  Abdomen benign neurologically intact skin intact.  Most likely pleuritic pain worse on deep breaths.  She will take Motrin 200 or 400 milligram tablets with food 3 times a day.  She will use a pillow or hug a pillow to control her pain, she will use a heating pad on upper thoracic spine for half an hour every 2 hours only when awake.  Try to lose weight.  Assessment/Plan   Diagnoses and all orders for this visit:  Anterior pleuritic pain  Bronchitis, complicated  Thoracic spine pain         "

## 2025-05-09 ENCOUNTER — OFFICE VISIT (OUTPATIENT)
Dept: PRIMARY CARE | Facility: CLINIC | Age: 45
End: 2025-05-09
Payer: COMMERCIAL

## 2025-05-09 VITALS
TEMPERATURE: 97.3 F | BODY MASS INDEX: 29.39 KG/M2 | SYSTOLIC BLOOD PRESSURE: 149 MMHG | HEART RATE: 77 BPM | OXYGEN SATURATION: 98 % | WEIGHT: 145.5 LBS | DIASTOLIC BLOOD PRESSURE: 103 MMHG

## 2025-05-09 DIAGNOSIS — Z13.1 SCREENING FOR DIABETES MELLITUS: ICD-10-CM

## 2025-05-09 DIAGNOSIS — D64.9 ANEMIA, UNSPECIFIED TYPE: ICD-10-CM

## 2025-05-09 DIAGNOSIS — R79.89 LOW VITAMIN D LEVEL: ICD-10-CM

## 2025-05-09 DIAGNOSIS — M06.9 RHEUMATOID ARTHRITIS, INVOLVING UNSPECIFIED SITE, UNSPECIFIED WHETHER RHEUMATOID FACTOR PRESENT (MULTI): ICD-10-CM

## 2025-05-09 DIAGNOSIS — Z12.11 SCREENING FOR MALIGNANT NEOPLASM OF COLON: Primary | ICD-10-CM

## 2025-05-09 DIAGNOSIS — R31.21 ASYMPTOMATIC MICROSCOPIC HEMATURIA: ICD-10-CM

## 2025-05-09 DIAGNOSIS — E78.2 MIXED HYPERLIPIDEMIA: ICD-10-CM

## 2025-05-09 DIAGNOSIS — G93.31 POSTVIRAL FATIGUE SYNDROME: ICD-10-CM

## 2025-05-09 DIAGNOSIS — I10 HYPERTENSION, UNSPECIFIED TYPE: ICD-10-CM

## 2025-05-09 DIAGNOSIS — I77.6 VASCULITIS (CMS-HCC): ICD-10-CM

## 2025-05-09 LAB
25(OH)D3 SERPL-MCNC: 37 NG/ML (ref 30–100)
ALBUMIN SERPL BCP-MCNC: 3.7 G/DL (ref 3.4–5)
ALP SERPL-CCNC: 81 U/L (ref 45–117)
ALT SERPL W P-5'-P-CCNC: 21 U/L (ref 16–63)
ANION GAP SERPL CALC-SCNC: 10 MMOL/L (ref 10–20)
APPEARANCE UR: CLEAR
AST SERPL W P-5'-P-CCNC: 18 U/L (ref 15–37)
BASOPHILS # BLD AUTO: 0.01 X10*3/UL (ref 0.1–1.6)
BASOPHILS NFR BLD AUTO: 0.21 % (ref 0–0.3)
BILIRUB SERPL-MCNC: 0.4 MG/DL (ref 0.2–1)
BILIRUB UR QL STRIP: NEGATIVE
BUN SERPL-MCNC: 12 MG/DL (ref 7–18)
CALCIUM SERPL-MCNC: 9 MG/DL (ref 8.5–10.1)
CHLORIDE SERPL-SCNC: 100 MMOL/L (ref 98–107)
CHOLEST SERPL-MCNC: 239 MG/DL (ref 0–199)
CHOLESTEROL/HDL RATIO: 3 (ref 4.2–7)
CO2 SERPL-SCNC: 33 MMOL/L (ref 21–32)
COLOR UR: YELLOW
CREAT SERPL-MCNC: 0.74 MG/DL (ref 0.6–1.1)
EGFRCR SERPLBLD CKD-EPI 2021: >90 ML/MIN/1.73M*2
EOSINOPHIL # BLD AUTO: 0.15 X10*3/UL (ref 0.04–0.5)
EOSINOPHIL NFR BLD AUTO: 2.83 % (ref 0.7–7)
ERYTHROCYTE [DISTWIDTH] IN BLOOD BY AUTOMATED COUNT: 16.4 % (ref 11.5–14.5)
GLUCOSE SERPL-MCNC: 88 MG/DL (ref 74–100)
GLUCOSE UR STRIP-MCNC: NEGATIVE MG/DL
HCT VFR BLD AUTO: 38.3 % (ref 36.6–46.6)
HDLC SERPL-MCNC: 81 MG/DL (ref 40–59)
HGB BLD-MCNC: 12.69 G/DL (ref 12–15.4)
HGB UR QL STRIP: NEGATIVE
IS PATIENT FASTING: ABNORMAL
KETONES UR STRIP-MCNC: ABNORMAL MG/DL
LDLC SERPL DIRECT ASSAY-MCNC: 130 MG/DL (ref 0–100)
LEUKOCYTE ESTERASE UR QL STRIP: NEGATIVE
LYMPHOCYTES # BLD AUTO: 1.97 X10*3/UL (ref 0–6)
LYMPHOCYTES NFR BLD AUTO: 38.16 % (ref 20.5–51.1)
MCH RBC QN AUTO: 25.9 PG (ref 26–32)
MCHC RBC AUTO-ENTMCNC: 33.1 G/DL (ref 31–38)
MCV RBC AUTO: 78.1 FL (ref 80–96)
MONOCYTES # BLD AUTO: 0.47 X10*3/UL (ref 1.6–24.9)
MONOCYTES NFR BLD AUTO: 9.06 % (ref 1.7–9.3)
NEUTROPHILS # BLD AUTO: 2.57 X10*3/UL (ref 1.4–6.5)
NEUTROPHILS NFR BLD AUTO: 49.74 % (ref 42.2–75.2)
NITRITE UR QL STRIP: NEGATIVE
PH UR STRIP: 7 [PH]
PLATELET # BLD AUTO: 271.9 X10*3/UL (ref 150–450)
PMV BLD AUTO: 8.59 FL (ref 7.8–11)
POTASSIUM SERPL-SCNC: 4.8 MMOL/L (ref 3.5–5.1)
PROT SERPL-MCNC: 7.5 G/DL (ref 6.4–8.2)
PROT UR STRIP-MCNC: NEGATIVE MG/DL
RBC # BLD AUTO: 4.9 X10*6/UL (ref 3.9–5.3)
SODIUM SERPL-SCNC: 138 MMOL/L (ref 136–145)
SP GR UR STRIP.AUTO: 1.02
TRIGL SERPL-MCNC: 74 MG/DL
UROBILINOGEN UR STRIP-ACNC: 0.2 E.U./DL
WBC # BLD AUTO: 5.17 X10*3/UL (ref 4.5–10.5)

## 2025-05-09 PROCEDURE — 3077F SYST BP >= 140 MM HG: CPT | Performed by: INTERNAL MEDICINE

## 2025-05-09 PROCEDURE — 82306 VITAMIN D 25 HYDROXY: CPT | Performed by: INTERNAL MEDICINE

## 2025-05-09 PROCEDURE — 1036F TOBACCO NON-USER: CPT | Performed by: INTERNAL MEDICINE

## 2025-05-09 PROCEDURE — 81003 URINALYSIS AUTO W/O SCOPE: CPT | Performed by: INTERNAL MEDICINE

## 2025-05-09 PROCEDURE — 99214 OFFICE O/P EST MOD 30 MIN: CPT | Performed by: INTERNAL MEDICINE

## 2025-05-09 PROCEDURE — 3080F DIAST BP >= 90 MM HG: CPT | Performed by: INTERNAL MEDICINE

## 2025-05-09 ASSESSMENT — ENCOUNTER SYMPTOMS
OCCASIONAL FEELINGS OF UNSTEADINESS: 0
DEPRESSION: 0
LOSS OF SENSATION IN FEET: 0

## 2025-05-09 NOTE — PROGRESS NOTES
Subjective   Patient ID: Nisa Kenny is a 44 y.o. female who presents for Fatigue (X 3 weeks, before menses cycle).    HPI   44 years old female comes in to see me today complaining of feeling tired and fatigued for the last 3 weeks.  She sleeps well and her appetite is good only has 2 meals per day and her feet feel tingly and numb.  Concerned because her aunt has lupus.  She admits to having heavy.  And losing a lot of blood.  I do not doubt that she may be anemic.  Review of Systems  12 system review 12 system negative heavy.  Tiredness and fatigue for the last 3 weeks.  Is not taking vitamin C or vitamin D as prescribed.  Objective   BP (!) 149/103 (BP Location: Left arm, Patient Position: Sitting, BP Cuff Size: Adult)   Pulse 77   Temp 36.3 °C (97.3 °F) (Temporal)   Wt 66 kg (145 lb 8 oz)   SpO2 98%   BMI 29.39 kg/m²     Physical Exam  Alert oriented no distress pleasant cooperative.  Nonicteric sclera no jaundice.  Pale looking.  Face symmetrical and cranial nerves intact.  Neck supple no masses lymph no thyromegaly or jugular venous distention no carotid bruits.  Lungs clear no rales wheezing or crackles.  Heart normal S1 and S2 regular rhythm.  Abdomen benign nontender no masses no organomegaly.  No rebound no pain.  No abdominal or aortic murmur or bruits.  Neurologically intact.  Skin intact.  Colonoscopy was done in 2024.  PFT done in 2025  X-ray of the chest 2025  Mammogram November 2024.  Lab ordered for CBC TSH CMP urinalysis A1c vitamin D ANDRÉS lupus anticoagulant rheumatoid factor.  Patient was advised to consult her OB/GYN because she is having very heavy.  And losing lots of blood according to her.  And she agreed to that.  Assessment/Plan   Diagnoses and all orders for this visit:  Screening for malignant neoplasm of colon  -     Colonoscopy Screening; Average Risk Patient; Future  Hypertension, unspecified type  -     Comprehensive Metabolic Panel  Anemia, unspecified type  -     CBC w/5  Part Differential, [facility]  Mixed hyperlipidemia  -     Lipid Panel  Asymptomatic microscopic hematuria  -     POCT UA (Automated) docked device  Rheumatoid arthritis, involving unspecified site, unspecified whether rheumatoid factor present (Multi)  -     ANDRÉS  -     Rheumatoid factor  Postviral fatigue syndrome  -     Lupus Anticoagulant With Interpretation [RIBERA]; Future  Vasculitis (CMS-HCC)  -     ANDRÉS  Low vitamin D level  -     Vitamin D 25-Hydroxy,Total (for eval of Vitamin D levels)  -     Hemoglobin A1c  Screening for diabetes mellitus  -     Hemoglobin A1c  Other orders  -     POCT URINALYSIS AUTOMATED

## 2025-05-12 LAB
ANA SER QL IF: NEGATIVE
HBA1C MFR BLD: 6 %
RHEUMATOID FACT SERPL-ACNC: 10 IU/ML

## 2025-05-14 LAB
LA 2 SCREEN W REFLEX-IMP: NORMAL
SCREEN APTT: 35 SEC
SCREEN DRVVT: 41 SEC

## 2025-05-16 ENCOUNTER — TELEPHONE (OUTPATIENT)
Dept: PRIMARY CARE | Facility: CLINIC | Age: 45
End: 2025-05-16
Payer: COMMERCIAL

## 2025-05-16 NOTE — TELEPHONE ENCOUNTER
----- Message from Mayo Carrasco sent at 5/15/2025  7:06 AM EDT -----  Regarding: r  Results from the last visit reveals normal urinalysis no signs of infection or bleeding.  Normal liver function kidney function blood sugar and electrolytes.  Normal complete blood count.  The test done on lupus came back negative.  No rheumatoid factor present negative.  Your test for vasculitis came back negative.  A1c went up to 6 so be careful with your diet you need to slow down eat low-fat diet low carbohydrate diet lose weight.  Cholesterol went up with LDL cholesterol should be below 200 and yours is 239.  LDL or bad cholesterol went up 130 should be below 100.  Your vitamin D is in the normal lower range you could take vitamin D as much as you want up to 1000 or 2000 mg/day.  Let us know how you feel if you have any question call the office anytime thank , have a good day

## 2025-06-05 ENCOUNTER — APPOINTMENT (OUTPATIENT)
Dept: PRIMARY CARE | Facility: CLINIC | Age: 45
End: 2025-06-05
Payer: COMMERCIAL

## 2025-06-12 ENCOUNTER — OFFICE VISIT (OUTPATIENT)
Dept: PRIMARY CARE | Facility: CLINIC | Age: 45
End: 2025-06-12
Payer: COMMERCIAL

## 2025-06-12 VITALS
OXYGEN SATURATION: 96 % | BODY MASS INDEX: 29.08 KG/M2 | HEART RATE: 89 BPM | DIASTOLIC BLOOD PRESSURE: 93 MMHG | SYSTOLIC BLOOD PRESSURE: 130 MMHG | TEMPERATURE: 98 F | WEIGHT: 144 LBS

## 2025-06-12 DIAGNOSIS — H93.233 HEARING ABNORMALLY ACUTE, BILATERAL: Primary | ICD-10-CM

## 2025-06-12 DIAGNOSIS — H61.21 IMPACTED CERUMEN OF RIGHT EAR: ICD-10-CM

## 2025-06-12 PROCEDURE — 99213 OFFICE O/P EST LOW 20 MIN: CPT | Performed by: INTERNAL MEDICINE

## 2025-06-12 PROCEDURE — 69210 REMOVE IMPACTED EAR WAX UNI: CPT | Performed by: INTERNAL MEDICINE

## 2025-06-12 PROCEDURE — 1036F TOBACCO NON-USER: CPT | Performed by: INTERNAL MEDICINE

## 2025-06-12 ASSESSMENT — ENCOUNTER SYMPTOMS
DEPRESSION: 0
LOSS OF SENSATION IN FEET: 0
OCCASIONAL FEELINGS OF UNSTEADINESS: 0

## 2025-06-12 NOTE — PROGRESS NOTES
Subjective   Patient ID: Nisa Kenny is a 44 y.o. female who presents for Ear Fullness (Right ear muffled sound x 2 weeks).    HPI   44 years old female comes in to see me today complaining of her right ear diminished hearing and muffled noise.  It has been going on for 2 weeks.  No drainage or serious pain in the ear.  Left ear feels fine.  Review of Systems  12 system review 12 system negative.  Objective   BP (!) 130/93 (BP Location: Right arm, Patient Position: Sitting, BP Cuff Size: Adult)   Pulse 89   Temp 36.7 °C (98 °F) (Temporal)   Wt 65.3 kg (144 lb)   SpO2 96%   BMI 29.08 kg/m²     Physical Exam  Unchanged on physical examination.  Left ear checked and it is clear from wax or any kind of infection.  Right ear is impacted with wax again no signs of infection or pus seen.  Will need irrigation.  Assessment/Plan   Diagnoses and all orders for this visit:  Hearing abnormally acute, bilateral  Impacted cerumen of right ear  -     Ear Cerumen Removal

## 2025-11-28 ENCOUNTER — APPOINTMENT (OUTPATIENT)
Dept: OPHTHALMOLOGY | Facility: CLINIC | Age: 45
End: 2025-11-28
Payer: COMMERCIAL